# Patient Record
Sex: MALE | Race: WHITE | NOT HISPANIC OR LATINO | URBAN - METROPOLITAN AREA
[De-identification: names, ages, dates, MRNs, and addresses within clinical notes are randomized per-mention and may not be internally consistent; named-entity substitution may affect disease eponyms.]

---

## 2024-03-22 PROBLEM — Z00.00 ENCOUNTER FOR PREVENTIVE HEALTH EXAMINATION: Status: ACTIVE | Noted: 2024-03-22

## 2024-03-26 ENCOUNTER — OUTPATIENT (OUTPATIENT)
Dept: OUTPATIENT SERVICES | Facility: HOSPITAL | Age: 84
LOS: 1 days | End: 2024-03-26
Payer: COMMERCIAL

## 2024-03-26 DIAGNOSIS — Z12.9 ENCOUNTER FOR SCREENING FOR MALIGNANT NEOPLASM, SITE UNSPECIFIED: ICD-10-CM

## 2024-03-26 DIAGNOSIS — Z00.8 ENCOUNTER FOR OTHER GENERAL EXAMINATION: ICD-10-CM

## 2024-03-26 PROCEDURE — 74221 X-RAY XM ESOPHAGUS 2CNTRST: CPT

## 2024-03-26 PROCEDURE — 74221 X-RAY XM ESOPHAGUS 2CNTRST: CPT | Mod: 26

## 2024-03-27 DIAGNOSIS — Z12.9 ENCOUNTER FOR SCREENING FOR MALIGNANT NEOPLASM, SITE UNSPECIFIED: ICD-10-CM

## 2024-10-01 ENCOUNTER — INPATIENT (INPATIENT)
Facility: HOSPITAL | Age: 84
LOS: 2 days | Discharge: HOME | DRG: 871 | End: 2024-10-04
Attending: INTERNAL MEDICINE | Admitting: STUDENT IN AN ORGANIZED HEALTH CARE EDUCATION/TRAINING PROGRAM
Payer: MEDICARE

## 2024-10-01 VITALS
RESPIRATION RATE: 20 BRPM | WEIGHT: 240.08 LBS | OXYGEN SATURATION: 97 % | SYSTOLIC BLOOD PRESSURE: 140 MMHG | DIASTOLIC BLOOD PRESSURE: 70 MMHG | HEART RATE: 81 BPM | HEIGHT: 69 IN | TEMPERATURE: 102 F

## 2024-10-01 DIAGNOSIS — A41.9 SEPSIS, UNSPECIFIED ORGANISM: ICD-10-CM

## 2024-10-01 LAB
ALBUMIN SERPL ELPH-MCNC: 4.1 G/DL — SIGNIFICANT CHANGE UP (ref 3.5–5.2)
ALP SERPL-CCNC: 95 U/L — SIGNIFICANT CHANGE UP (ref 30–115)
ALT FLD-CCNC: 28 U/L — SIGNIFICANT CHANGE UP (ref 0–41)
ANION GAP SERPL CALC-SCNC: 13 MMOL/L — SIGNIFICANT CHANGE UP (ref 7–14)
APPEARANCE UR: CLEAR — SIGNIFICANT CHANGE UP
APTT BLD: 25.8 SEC — LOW (ref 27–39.2)
AST SERPL-CCNC: 27 U/L — SIGNIFICANT CHANGE UP (ref 0–41)
BACTERIA # UR AUTO: ABNORMAL /HPF
BASOPHILS # BLD AUTO: 0 K/UL — SIGNIFICANT CHANGE UP (ref 0–0.2)
BASOPHILS NFR BLD AUTO: 0 % — SIGNIFICANT CHANGE UP (ref 0–1)
BILIRUB SERPL-MCNC: 1.1 MG/DL — SIGNIFICANT CHANGE UP (ref 0.2–1.2)
BILIRUB UR-MCNC: ABNORMAL
BUN SERPL-MCNC: 23 MG/DL — HIGH (ref 10–20)
CALCIUM SERPL-MCNC: 9.3 MG/DL — SIGNIFICANT CHANGE UP (ref 8.4–10.5)
CHLORIDE SERPL-SCNC: 103 MMOL/L — SIGNIFICANT CHANGE UP (ref 98–110)
CO2 SERPL-SCNC: 23 MMOL/L — SIGNIFICANT CHANGE UP (ref 17–32)
COLOR SPEC: SIGNIFICANT CHANGE UP
CREAT SERPL-MCNC: 1.8 MG/DL — HIGH (ref 0.7–1.5)
DIFF PNL FLD: NEGATIVE — SIGNIFICANT CHANGE UP
EGFR: 37 ML/MIN/1.73M2 — LOW
EOSINOPHIL NFR BLD AUTO: 0 % — SIGNIFICANT CHANGE UP (ref 0–8)
EPI CELLS # UR: PRESENT
FLUAV AG NPH QL: SIGNIFICANT CHANGE UP
FLUBV AG NPH QL: SIGNIFICANT CHANGE UP
GLUCOSE SERPL-MCNC: 203 MG/DL — HIGH (ref 70–99)
GLUCOSE UR QL: NEGATIVE MG/DL — SIGNIFICANT CHANGE UP
HCT VFR BLD CALC: 45.4 % — SIGNIFICANT CHANGE UP (ref 42–52)
HGB BLD-MCNC: 15.6 G/DL — SIGNIFICANT CHANGE UP (ref 14–18)
INR BLD: 1.16 RATIO — SIGNIFICANT CHANGE UP (ref 0.65–1.3)
KETONES UR-MCNC: ABNORMAL MG/DL
LACTATE SERPL-SCNC: 3.7 MMOL/L — HIGH (ref 0.7–2)
LACTATE SERPL-SCNC: 3.9 MMOL/L — HIGH (ref 0.7–2)
LACTATE SERPL-SCNC: 4.6 MMOL/L — CRITICAL HIGH (ref 0.7–2)
LACTATE SERPL-SCNC: 6 MMOL/L — CRITICAL HIGH (ref 0.7–2)
LEUKOCYTE ESTERASE UR-ACNC: NEGATIVE — SIGNIFICANT CHANGE UP
LYMPHOCYTES # BLD AUTO: 0.62 K/UL — LOW (ref 1.2–3.4)
LYMPHOCYTES # BLD AUTO: 6 % — LOW (ref 20.5–51.1)
MCHC RBC-ENTMCNC: 31.5 PG — HIGH (ref 27–31)
MCHC RBC-ENTMCNC: 34.4 G/DL — SIGNIFICANT CHANGE UP (ref 32–37)
MCV RBC AUTO: 91.5 FL — SIGNIFICANT CHANGE UP (ref 80–94)
METAMYELOCYTES # FLD: 1 % — HIGH (ref 0–0)
MONOCYTES # BLD AUTO: 0.21 K/UL — SIGNIFICANT CHANGE UP (ref 0.1–0.6)
MONOCYTES NFR BLD AUTO: 2 % — SIGNIFICANT CHANGE UP (ref 1.7–9.3)
MUCOUS THREADS # UR AUTO: PRESENT
NEUTROPHILS # BLD AUTO: 9.43 K/UL — HIGH (ref 1.4–6.5)
NEUTROPHILS NFR BLD AUTO: 77 % — HIGH (ref 42.2–75.2)
NEUTS BAND # BLD: 14 % — HIGH (ref 0–6)
NITRITE UR-MCNC: NEGATIVE — SIGNIFICANT CHANGE UP
NRBC # BLD: 0 /100 WBCS — SIGNIFICANT CHANGE UP (ref 0–0)
NRBC # BLD: SIGNIFICANT CHANGE UP /100 WBCS (ref 0–0)
PH UR: 5.5 — SIGNIFICANT CHANGE UP (ref 5–8)
PLAT MORPH BLD: NORMAL — SIGNIFICANT CHANGE UP
PLATELET # BLD AUTO: 130 K/UL — SIGNIFICANT CHANGE UP (ref 130–400)
PLATELET COUNT - ESTIMATE: NORMAL — SIGNIFICANT CHANGE UP
PMV BLD: 10 FL — SIGNIFICANT CHANGE UP (ref 7.4–10.4)
POTASSIUM SERPL-MCNC: 4.1 MMOL/L — SIGNIFICANT CHANGE UP (ref 3.5–5)
POTASSIUM SERPL-SCNC: 4.1 MMOL/L — SIGNIFICANT CHANGE UP (ref 3.5–5)
PROT SERPL-MCNC: 6.8 G/DL — SIGNIFICANT CHANGE UP (ref 6–8)
PROT UR-MCNC: 100 MG/DL
PROTHROM AB SERPL-ACNC: 13.2 SEC — HIGH (ref 9.95–12.87)
RBC # BLD: 4.96 M/UL — SIGNIFICANT CHANGE UP (ref 4.7–6.1)
RBC # FLD: 12.5 % — SIGNIFICANT CHANGE UP (ref 11.5–14.5)
RBC BLD AUTO: NORMAL — SIGNIFICANT CHANGE UP
RBC CASTS # UR COMP ASSIST: 3 /HPF — SIGNIFICANT CHANGE UP (ref 0–4)
RSV RNA NPH QL NAA+NON-PROBE: SIGNIFICANT CHANGE UP
SARS-COV-2 RNA SPEC QL NAA+PROBE: SIGNIFICANT CHANGE UP
SODIUM SERPL-SCNC: 139 MMOL/L — SIGNIFICANT CHANGE UP (ref 135–146)
SP GR SPEC: 1.02 — SIGNIFICANT CHANGE UP (ref 1–1.03)
SQUAMOUS # UR AUTO: 2 /HPF — SIGNIFICANT CHANGE UP (ref 0–5)
UROBILINOGEN FLD QL: 1 MG/DL — SIGNIFICANT CHANGE UP (ref 0.2–1)
WBC # BLD: 10.36 K/UL — SIGNIFICANT CHANGE UP (ref 4.8–10.8)
WBC # FLD AUTO: 10.36 K/UL — SIGNIFICANT CHANGE UP (ref 4.8–10.8)
WBC UR QL: 4 /HPF — SIGNIFICANT CHANGE UP (ref 0–5)

## 2024-10-01 PROCEDURE — 84300 ASSAY OF URINE SODIUM: CPT

## 2024-10-01 PROCEDURE — 84540 ASSAY OF URINE/UREA-N: CPT

## 2024-10-01 PROCEDURE — 93970 EXTREMITY STUDY: CPT | Mod: 26

## 2024-10-01 PROCEDURE — 87640 STAPH A DNA AMP PROBE: CPT

## 2024-10-01 PROCEDURE — 83605 ASSAY OF LACTIC ACID: CPT

## 2024-10-01 PROCEDURE — 71045 X-RAY EXAM CHEST 1 VIEW: CPT | Mod: 26

## 2024-10-01 PROCEDURE — 99291 CRITICAL CARE FIRST HOUR: CPT

## 2024-10-01 PROCEDURE — 80053 COMPREHEN METABOLIC PANEL: CPT

## 2024-10-01 PROCEDURE — 87899 AGENT NOS ASSAY W/OPTIC: CPT

## 2024-10-01 PROCEDURE — 0225U NFCT DS DNA&RNA 21 SARSCOV2: CPT

## 2024-10-01 PROCEDURE — 87449 NOS EACH ORGANISM AG IA: CPT

## 2024-10-01 PROCEDURE — 87641 MR-STAPH DNA AMP PROBE: CPT

## 2024-10-01 PROCEDURE — 85025 COMPLETE CBC W/AUTO DIFF WBC: CPT

## 2024-10-01 PROCEDURE — 93306 TTE W/DOPPLER COMPLETE: CPT

## 2024-10-01 PROCEDURE — 83935 ASSAY OF URINE OSMOLALITY: CPT

## 2024-10-01 PROCEDURE — 99223 1ST HOSP IP/OBS HIGH 75: CPT

## 2024-10-01 PROCEDURE — 97162 PT EVAL MOD COMPLEX 30 MIN: CPT | Mod: GP

## 2024-10-01 PROCEDURE — 84133 ASSAY OF URINE POTASSIUM: CPT

## 2024-10-01 PROCEDURE — 82570 ASSAY OF URINE CREATININE: CPT

## 2024-10-01 PROCEDURE — 36415 COLL VENOUS BLD VENIPUNCTURE: CPT

## 2024-10-01 PROCEDURE — 84156 ASSAY OF PROTEIN URINE: CPT

## 2024-10-01 RX ORDER — ATENOLOL 25 MG/1
25 TABLET ORAL DAILY
Refills: 0 | Status: DISCONTINUED | OUTPATIENT
Start: 2024-10-01 | End: 2024-10-01

## 2024-10-01 RX ORDER — TAMSULOSIN HCL 0.4 MG
1 CAPSULE ORAL
Refills: 0 | DISCHARGE

## 2024-10-01 RX ORDER — CHLORHEXIDINE GLUCONATE ORAL RINSE 1.2 MG/ML
1 SOLUTION DENTAL
Refills: 0 | Status: DISCONTINUED | OUTPATIENT
Start: 2024-10-01 | End: 2024-10-04

## 2024-10-01 RX ORDER — SODIUM CHLORIDE IRRIG SOLUTION 0.9 %
1000 SOLUTION, IRRIGATION IRRIGATION ONCE
Refills: 0 | Status: DISCONTINUED | OUTPATIENT
Start: 2024-10-01 | End: 2024-10-04

## 2024-10-01 RX ORDER — GABAPENTIN 800 MG/1
2 TABLET, FILM COATED ORAL
Refills: 0 | DISCHARGE

## 2024-10-01 RX ORDER — 5-HYDROXYTRYPTOPHAN (5-HTP) 100 MG
3 TABLET,DISINTEGRATING ORAL AT BEDTIME
Refills: 0 | Status: DISCONTINUED | OUTPATIENT
Start: 2024-10-01 | End: 2024-10-04

## 2024-10-01 RX ORDER — VANCOMYCIN HCL-SODIUM CHLORIDE IV SOLN 1.5 GM/250ML-0.9% 1.5-0.9/25 GM/ML-%
1000 SOLUTION INTRAVENOUS EVERY 24 HOURS
Refills: 0 | Status: DISCONTINUED | OUTPATIENT
Start: 2024-10-01 | End: 2024-10-02

## 2024-10-01 RX ORDER — TAMSULOSIN HCL 0.4 MG
0.4 CAPSULE ORAL AT BEDTIME
Refills: 0 | Status: DISCONTINUED | OUTPATIENT
Start: 2024-10-01 | End: 2024-10-04

## 2024-10-01 RX ORDER — MAG HYDROX/ALUMINUM HYD/SIMETH 200-200-20
30 SUSPENSION, ORAL (FINAL DOSE FORM) ORAL EVERY 4 HOURS
Refills: 0 | Status: DISCONTINUED | OUTPATIENT
Start: 2024-10-01 | End: 2024-10-04

## 2024-10-01 RX ORDER — CEFEPIME 2 G/1
1000 INJECTION, POWDER, FOR SOLUTION INTRAVENOUS EVERY 12 HOURS
Refills: 0 | Status: DISCONTINUED | OUTPATIENT
Start: 2024-10-01 | End: 2024-10-02

## 2024-10-01 RX ORDER — CEFEPIME 2 G/1
2000 INJECTION, POWDER, FOR SOLUTION INTRAVENOUS ONCE
Refills: 0 | Status: COMPLETED | OUTPATIENT
Start: 2024-10-01 | End: 2024-10-01

## 2024-10-01 RX ORDER — SODIUM CHLORIDE 0.9 % (FLUSH) 0.9 %
1000 SYRINGE (ML) INJECTION
Refills: 0 | Status: COMPLETED | OUTPATIENT
Start: 2024-10-01 | End: 2024-10-02

## 2024-10-01 RX ORDER — FUROSEMIDE 10 MG/ML
1 INJECTION INTRAVENOUS
Refills: 0 | DISCHARGE

## 2024-10-01 RX ORDER — ONDANSETRON HCL/PF 4 MG/2 ML
4 VIAL (ML) INJECTION EVERY 8 HOURS
Refills: 0 | Status: DISCONTINUED | OUTPATIENT
Start: 2024-10-01 | End: 2024-10-04

## 2024-10-01 RX ORDER — GABAPENTIN 800 MG/1
300 TABLET, FILM COATED ORAL EVERY 8 HOURS
Refills: 0 | Status: DISCONTINUED | OUTPATIENT
Start: 2024-10-01 | End: 2024-10-04

## 2024-10-01 RX ORDER — ACETAMINOPHEN 325 MG
650 TABLET ORAL EVERY 6 HOURS
Refills: 0 | Status: DISCONTINUED | OUTPATIENT
Start: 2024-10-01 | End: 2024-10-04

## 2024-10-01 RX ORDER — SPIRONOLACTONE 100 MG/1
1 TABLET, FILM COATED ORAL
Refills: 0 | DISCHARGE

## 2024-10-01 RX ORDER — LOSARTAN POTASSIUM 100 MG/1
1 TABLET, FILM COATED ORAL
Refills: 0 | DISCHARGE

## 2024-10-01 RX ORDER — INFLUENZA VIRUS VACCINE 15; 15; 15; 15 UG/.5ML; UG/.5ML; UG/.5ML; UG/.5ML
0.5 SUSPENSION INTRAMUSCULAR ONCE
Refills: 0 | Status: DISCONTINUED | OUTPATIENT
Start: 2024-10-01 | End: 2024-10-04

## 2024-10-01 RX ORDER — SPIRONOLACTONE 100 MG/1
25 TABLET, FILM COATED ORAL DAILY
Refills: 0 | Status: DISCONTINUED | OUTPATIENT
Start: 2024-10-01 | End: 2024-10-01

## 2024-10-01 RX ORDER — VANCOMYCIN HCL-SODIUM CHLORIDE IV SOLN 1.5 GM/250ML-0.9% 1.5-0.9/25 GM/ML-%
1750 SOLUTION INTRAVENOUS ONCE
Refills: 0 | Status: COMPLETED | OUTPATIENT
Start: 2024-10-01 | End: 2024-10-01

## 2024-10-01 RX ORDER — SODIUM CHLORIDE IRRIG SOLUTION 0.9 %
3400 SOLUTION, IRRIGATION IRRIGATION ONCE
Refills: 0 | Status: COMPLETED | OUTPATIENT
Start: 2024-10-01 | End: 2024-10-01

## 2024-10-01 RX ORDER — ACETAMINOPHEN 325 MG
975 TABLET ORAL ONCE
Refills: 0 | Status: COMPLETED | OUTPATIENT
Start: 2024-10-01 | End: 2024-10-01

## 2024-10-01 RX ADMIN — GABAPENTIN 300 MILLIGRAM(S): 800 TABLET, FILM COATED ORAL at 21:16

## 2024-10-01 RX ADMIN — Medication 975 MILLIGRAM(S): at 12:04

## 2024-10-01 RX ADMIN — Medication 3400 MILLILITER(S): at 16:20

## 2024-10-01 RX ADMIN — CEFEPIME 100 MILLIGRAM(S): 2 INJECTION, POWDER, FOR SOLUTION INTRAVENOUS at 18:01

## 2024-10-01 RX ADMIN — CEFEPIME 100 MILLIGRAM(S): 2 INJECTION, POWDER, FOR SOLUTION INTRAVENOUS at 12:04

## 2024-10-01 RX ADMIN — Medication 3400 MILLILITER(S): at 12:06

## 2024-10-01 RX ADMIN — VANCOMYCIN HCL-SODIUM CHLORIDE IV SOLN 1.5 GM/250ML-0.9% 250 MILLIGRAM(S): 1.5-0.9/25 SOLUTION at 18:18

## 2024-10-01 RX ADMIN — Medication 0.4 MILLIGRAM(S): at 21:16

## 2024-10-01 RX ADMIN — Medication 100 MILLILITER(S): at 18:06

## 2024-10-01 RX ADMIN — Medication 650 MILLIGRAM(S): at 20:34

## 2024-10-01 RX ADMIN — VANCOMYCIN HCL-SODIUM CHLORIDE IV SOLN 1.5 GM/250ML-0.9% 250 MILLIGRAM(S): 1.5-0.9/25 SOLUTION at 12:09

## 2024-10-01 RX ADMIN — Medication 5000 UNIT(S): at 18:07

## 2024-10-01 NOTE — H&P ADULT - NSICDXPASTMEDICALHX_GEN_ALL_CORE_FT
PAST MEDICAL HISTORY:  BPH (benign prostatic hyperplasia)     CAD (coronary artery disease)     H/O neuropathy     HLD (hyperlipidemia)     HTN, age 0-18     Lymphedema

## 2024-10-01 NOTE — ED ADULT NURSE REASSESSMENT NOTE - NS ED NURSE REASSESS COMMENT FT1
pt continuously bending arm, occluding fluids. Placed second IVL in rt hand #20. Will obtain BPs after fluid boluses are completed.

## 2024-10-01 NOTE — H&P ADULT - NSHPPHYSICALEXAM_GEN_ALL_CORE
Vital Signs Last 24 Hrs  T(C): 36.9 (01 Oct 2024 16:47), Max: 38.7 (01 Oct 2024 11:36)  T(F): 98.5 (01 Oct 2024 16:47), Max: 101.7 (01 Oct 2024 11:36)  HR: 105 (01 Oct 2024 16:47) (81 - 117)  BP: 113/70 (01 Oct 2024 16:47) (101/58 - 140/70)  RR: 18 (01 Oct 2024 16:47) (16 - 20)  SpO2: 95% (01 Oct 2024 16:47) (95% - 97%)    Parameters below as of 01 Oct 2024 16:47  Patient On (Oxygen Delivery Method): room air      GENERAL:  84y/o Male NAD, resting comfortably.  EYES: EOMI, conjunctiva and sclera clear  CHEST/LUNG: Clear to auscultation bilaterally; No wheeze, rhonchi, or rales  HEART: Regular rate and rhythm; S1&S2  ABDOMEN: Soft, Nontender, Nondistended x 4 quadrants; Bowel sounds present  EXTREMITIES:   Peripheral Pulses Present, B/L LE swelling L>R, 2+ pitting edema b/l, mild ttp LLE w/ surrounding erythema   PSYCH: AAOx3, cooperative, appropriate  NEUROLOGY: WNL

## 2024-10-01 NOTE — H&P ADULT - HISTORY OF PRESENT ILLNESS
Patient is an 82yo male w/ pmhx of HTN, HLD, CAD s/p stent, chronic lymphedema, peripheral neuropathy, JA - not on CPAP, BPH, brought in by daughter for chills, weakness, and worsening lower extremity swelling w/ erythema. Patient reports he felt mild chills at home today. Daughter came to visit and noticed worsening swelling in the extremities prompting visit to the ED. Patient denies fever at home, n/v, chest pain, SOB, abdominal pain, changes in BMs, urinary sxs, sick contacts, recent travels.

## 2024-10-01 NOTE — H&P ADULT - NS ATTEND AMEND GEN_ALL_CORE FT
Patient is an 82yo male w/ pmhx of HTN, HLD, CAD s/p stent, chronic lymphedema, peripheral neuropathy, JA - not on CPAP, BPH, brought in by daughter for chills, weakness, and worsening lower extremity swelling w/ erythema being admitted for sepsis due to suspected cellulitis. Patient presented with family. Family reports that the patient was more confused, with high fevers and chills at home. His L lower extremity is more swollen than the R and more erythematous and hot to touch. Denies any other notable localizing symptoms, in particular no headaches, photophobia, sore throat, upper or lower resp symptoms, no abdominal pain or diarrhea, no nausea or vomiting, no new swollen joints, no urinary discomfort.    General: Appears comfortable, AOx4.   HEENT: NCAT  Neck: Supple  Heart: RRR  Lungs: CTAB  Extremities: Bilateral 1-2+ edema, L>R with significant erythema of the LLE.    #Sepsis  #Probable cellulitis  Patient presents with high fever, elevated lactate and mildly elevated white count with only localizing symptom a swollen LLE with erythema. Doppler negative for DVT. Blood cultures obtained. Patient tolerated a dose of vanc and cefepime nicely, will continue same antibiotics. Will consult ID. Will repeat a bolus of fluids and start maintenance @100cc/hr. Hold all blood pressure meds and beta blockers in status of sepsis. Repeat lactate at 19.00.  Rest per PA note    Code: Full  PPx: heparin

## 2024-10-01 NOTE — H&P ADULT - NSHPLABSRESULTS_GEN_ALL_CORE
15.6   10.36 )-----------( 130      ( 01 Oct 2024 11:40 )             45.4       10-01    139  |  103  |  23[H]  ----------------------------<  203[H]  4.1   |  23  |  1.8[H]    Ca    9.3      01 Oct 2024 11:40    TPro  6.8  /  Alb  4.1  /  TBili  1.1  /  DBili  x   /  AST  27  /  ALT  28  /  AlkPhos  95  10              Urinalysis Basic - ( 01 Oct 2024 15:50 )    Color: Dark Yellow / Appearance: Clear / S.025 / pH: x  Gluc: x / Ketone: Trace mg/dL  / Bili: Small / Urobili: 1.0 mg/dL   Blood: x / Protein: 100 mg/dL / Nitrite: Negative   Leuk Esterase: Negative / RBC: x / WBC x   Sq Epi: x / Non Sq Epi: x / Bacteria: x        PT/INR - ( 01 Oct 2024 11:40 )   PT: 13.20 sec;   INR: 1.16 ratio         PTT - ( 01 Oct 2024 11:40 )  PTT:25.8 sec    Lactate Trend  10-01 @ 12:49 Lactate:6.0   10-01 @ 11:40 Lactate:3.7       < from: [facility] Duplex Lower Ext Vein Scan, Bilat (10.01.24 @ 13:57) >  IMPRESSION:  No evidence of deep venous thrombosis in either lower extremity.  --- End of Report ---    < from: Xray Chest 1 View-PORTABLE IMMEDIATE (10.01.24 @ 12:13) >  Impression:  Bibasilar opacities.    --- End of Report ---

## 2024-10-01 NOTE — PATIENT PROFILE ADULT - FALL HARM RISK - HARM RISK INTERVENTIONS

## 2024-10-01 NOTE — ED PROVIDER NOTE - CLINICAL SUMMARY MEDICAL DECISION MAKING FREE TEXT BOX
83-year-old male past medical history of CAD with stents, lymphedema, sleep apnea presents to the emergency department for weakness, chills, redness and swelling to his leg on the left.  Additional history obtained from patient's daughter reports patient's had increasing weakness and confusion.  Patient developed a fever in the emergency department upon arrival but just had chills at home.  Patient had mildly decreased p.o. intake.  No falls or direct trauma.  No history of DVT or PE.    On exam, vital signs reviewed.  Patient febrile and concern for sepsis.  Patient has redness, warmth, swelling to left lower extremity concerning for cellulitis.  Patient has no abdominal pain or upper respiratory symptoms.  IV placed and labs sent, patient started on fluid resuscitation and immediate IV antibiotics.  Initial lactate sent and is elevated and was repeated and is higher.  DVT study performed and negative for DVT.  Patient has an elevated white blood cell count.  Patient remained hemodynamically stable and will admit for IV antibiotics and continued treatment and resuscitation for severe sepsis.    ED work up reviewed and results and plan of care discussed with patient. Patient requires admission for further work up, monitoring, and management. Need for admission discussed with patient.

## 2024-10-01 NOTE — H&P ADULT - ASSESSMENT
Patient is an 84yo male w/ pmhx of HTN, HLD, CAD s/p stent, chronic lymphedema, peripheral neuropathy, JA - not on CPAP, BPH, brought in by daughter for chills, weakness, and worsening lower extremity swelling w/ erythema being admitted for sepsis 2/2 to cellulitis     #Sepsis   #LLE Cellulitis   - Temp 101, , WBC 10.36 w/ bands, Lactate 3.7 > 6.0  - B/l LE Duplex - No evidence of deep venous thrombosis in either lower extremity.  - IV abx - Cefepime & Vancomycin   - s/p 3400ml IVF Bolus  - IVF - NS @100ml/hr x 12hrs, FU repeat Lactate   - FU BCx  - Tylenol prn for fever  - Monitor WBC   - ID Consult    #ARCHIE  - Cr 1.8, Baseline Cr unknown  - Last Cr in [facility] in 2016 - 1.27  - will hold losartan and furosemide in setting of ARCHIE, continue in am  - s/p IV Bolus  - Trend RF  - Urine lytes, AM Labs  - Avoid Nephrotoxic agents    #Chronic Lymphedema   #HTN  - hold losartan, furosemide, spironolactone in setting of ARCHIE  - likely will benefit from diuresis once improvement in lactate and renal function   - I&Os  - Daily weights   - Monitor BP     #HLD  - c/w Lipitor 20mg QD    #Peripheral Neuropathy   - Reports taking 1200mg of Gabapentin in am, will renally adjust it to 300 Q8 in setting of ARCHIE     #BPH  - c/w Flomax     Diet: DASH   Activity: AAT   VTE PPX: SubCut Heparin   Dispo: acute from home, septic    Plan Discussed and approved by attending on call.   Patient is an 82yo male w/ pmhx of HTN, HLD, CAD s/p stent, chronic lymphedema, peripheral neuropathy, JA - not on CPAP, BPH, brought in by daughter for chills, weakness, and worsening lower extremity swelling w/ erythema being admitted for sepsis 2/2 to cellulitis     #Sepsis   #LLE Cellulitis   - Temp 101, , WBC 10.36 w/ bands, Lactate 3.7 > 6.0  - B/l LE Duplex - No evidence of deep venous thrombosis in either lower extremity.  - IV abx - Cefepime & Vancomycin   - s/p 3400ml IVF Bolus  - IVF - NS @100ml/hr x 12hrs, FU repeat Lactate   - FU BCx  - Tylenol prn for fever  - Monitor WBC   - ID Consult    #ARCHIE  - Cr 1.8, Baseline Cr unknown  - Last Cr in [facility] in 2016 - 1.27  - NS @100ml/hr x 12 hrs   - will hold losartan and furosemide in setting of ARCHIE, continue in am if improvement   - Trend RF  - Urine lytes, AM Labs  - Avoid Nephrotoxic agents    #Chronic Lymphedema   #HTN  - hold losartan, furosemide, spironolactone in setting of ARCHIE  - c/w spironolactone 25mg QD   - likely will benefit from diuresis once improvement in lactate and renal function   - I&Os  - Daily weights   - Monitor BP   - Keep LE elevated     #HLD  - c/w Lipitor 20mg QD    #Peripheral Neuropathy   - Reports taking 1200mg of Gabapentin in am, will renally adjust it to 300 Q8 in setting of ARCHIE     #BPH  - c/w Flomax     Diet: DASH   Activity: AAT   VTE PPX: SubCut Heparin   Dispo: acute from home, septic    Plan Discussed and approved by attending on call.

## 2024-10-01 NOTE — ED PROVIDER NOTE - OBJECTIVE STATEMENT
82 y/o male with hx of CAD with stent , JA non compliant with CPAP, lymphedema, presents to the ED for evaluation of chills, weakness and confusion as per daughter. no vomiting or diarrhea. patient denies any sick contacts or travel. no sore throat, ear pain . no cough, sob, hemoptysis. patient with left lower extremity increased redness and warmth.

## 2024-10-01 NOTE — ED PROVIDER NOTE - CARE PLAN
Principal Discharge DX:	Sepsis  Secondary Diagnosis:	Cellulitis of leg, left   1 Principal Discharge DX:	Severe sepsis  Secondary Diagnosis:	Cellulitis of leg, left

## 2024-10-01 NOTE — ED PROVIDER NOTE - PHYSICAL EXAMINATION
Vital Signs: I have reviewed the initial vital signs.  Constitutional: well-nourished, no acute distress, normocephalic  Eyes: PERRLA, EOMI, clear conjunctiva  ENT: dry mucous membranes  Cardiovascular: regular rate, regular rhythm, no murmur appreciated  Respiratory: unlabored respiratory effort, clear to auscultation bilaterally  Gastrointestinal: soft, non-tender, non-distended  abdomen, no pulsatile mass  Musculoskeletal: supple neck, b/l lymphedema, LLE erythema, warmth, non weeping, no lymphangitis , no crepitation , distal pulses in tact   Integumentary: erythema to left lower extremity   Neurologic: awake, alert, cranial nerves II-XII grossly intact, extremities’ motor and sensory functions grossly intact, no focal deficits, GCS 15

## 2024-10-01 NOTE — ED PROVIDER NOTE - CRITICAL CARE ATTENDING CONTRIBUTION TO CARE
I personally evaluated patient. I agree with the findings and plan with all documentation on chart except as documented  in my note.    83-year-old male past medical history of CAD with stents, lymphedema, sleep apnea presents to the emergency department for weakness, chills, redness and swelling to his leg on the left.  Additional history obtained from patient's daughter reports patient's had increasing weakness and confusion.  Patient developed a fever in the emergency department upon arrival but just had chills at home.  Patient had mildly decreased p.o. intake.  No falls or direct trauma.  No history of DVT or PE.    On exam, vital signs reviewed.  Patient febrile and concern for sepsis.  Patient has redness, warmth, swelling to left lower extremity concerning for cellulitis.  Patient has no abdominal pain or upper respiratory symptoms.  IV placed and labs sent, patient started on fluid resuscitation and immediate IV antibiotics.  Initial lactate sent and is elevated and was repeated and is higher.  DVT study performed and negative for DVT.  Patient has an elevated white blood cell count.  Patient remained hemodynamically stable and will admit for IV antibiotics and continued treatment and resuscitation for severe sepsis.    ED work up reviewed and results and plan of care discussed with patient. Patient requires admission for further work up, monitoring, and management. Need for admission discussed with patient.

## 2024-10-01 NOTE — ED PROVIDER NOTE - PROGRESS NOTE DETAILS
Sepsis suspected at this time. Will send appropriate labs and cultures. 30 cc/kg fluid bolus given based on ideal body weight. Will give broad spectrum antibiotics after blood cultures are drawn. Will follow up initial lactate and repeat after fluids if lactate > 2. Repeat sepsis perfusion exam performed. Patient has warm skin with strong pulses and good capillary refill. Lungs are clear post IV fluid resuscitation. Repeat sepsis perfusion exam performed. Patient has warm skin with strong pulses and good capillary refill. Lungs are clear post IV fluid resuscitation. Initial lactate elevated and will be repeated.

## 2024-10-02 LAB
ALBUMIN SERPL ELPH-MCNC: 3.4 G/DL — LOW (ref 3.5–5.2)
ALP SERPL-CCNC: 87 U/L — SIGNIFICANT CHANGE UP (ref 30–115)
ALT FLD-CCNC: 20 U/L — SIGNIFICANT CHANGE UP (ref 0–41)
ANION GAP SERPL CALC-SCNC: 11 MMOL/L — SIGNIFICANT CHANGE UP (ref 7–14)
AST SERPL-CCNC: 27 U/L — SIGNIFICANT CHANGE UP (ref 0–41)
BASOPHILS # BLD AUTO: 0.03 K/UL — SIGNIFICANT CHANGE UP (ref 0–0.2)
BASOPHILS NFR BLD AUTO: 0.2 % — SIGNIFICANT CHANGE UP (ref 0–1)
BILIRUB SERPL-MCNC: 1.1 MG/DL — SIGNIFICANT CHANGE UP (ref 0.2–1.2)
BUN SERPL-MCNC: 23 MG/DL — HIGH (ref 10–20)
CALCIUM SERPL-MCNC: 8.5 MG/DL — SIGNIFICANT CHANGE UP (ref 8.4–10.5)
CHLORIDE SERPL-SCNC: 101 MMOL/L — SIGNIFICANT CHANGE UP (ref 98–110)
CO2 SERPL-SCNC: 26 MMOL/L — SIGNIFICANT CHANGE UP (ref 17–32)
CREAT ?TM UR-MCNC: 105 MG/DL — SIGNIFICANT CHANGE UP
CREAT SERPL-MCNC: 1.6 MG/DL — HIGH (ref 0.7–1.5)
EGFR: 42 ML/MIN/1.73M2 — LOW
EOSINOPHIL # BLD AUTO: 0 K/UL — SIGNIFICANT CHANGE UP (ref 0–0.7)
EOSINOPHIL NFR BLD AUTO: 0 % — SIGNIFICANT CHANGE UP (ref 0–8)
GLUCOSE SERPL-MCNC: 125 MG/DL — HIGH (ref 70–99)
HCT VFR BLD CALC: 40.8 % — LOW (ref 42–52)
HGB BLD-MCNC: 13.8 G/DL — LOW (ref 14–18)
IMM GRANULOCYTES NFR BLD AUTO: 0.8 % — HIGH (ref 0.1–0.3)
LACTATE SERPL-SCNC: 2.4 MMOL/L — HIGH (ref 0.7–2)
LYMPHOCYTES # BLD AUTO: 0.96 K/UL — LOW (ref 1.2–3.4)
LYMPHOCYTES # BLD AUTO: 7.1 % — LOW (ref 20.5–51.1)
MCHC RBC-ENTMCNC: 31.6 PG — HIGH (ref 27–31)
MCHC RBC-ENTMCNC: 33.8 G/DL — SIGNIFICANT CHANGE UP (ref 32–37)
MCV RBC AUTO: 93.4 FL — SIGNIFICANT CHANGE UP (ref 80–94)
MONOCYTES # BLD AUTO: 0.52 K/UL — SIGNIFICANT CHANGE UP (ref 0.1–0.6)
MONOCYTES NFR BLD AUTO: 3.8 % — SIGNIFICANT CHANGE UP (ref 1.7–9.3)
MRSA PCR RESULT.: NEGATIVE — SIGNIFICANT CHANGE UP
NEUTROPHILS # BLD AUTO: 11.96 K/UL — HIGH (ref 1.4–6.5)
NEUTROPHILS NFR BLD AUTO: 88.1 % — HIGH (ref 42.2–75.2)
NRBC # BLD: 0 /100 WBCS — SIGNIFICANT CHANGE UP (ref 0–0)
PLATELET # BLD AUTO: 125 K/UL — LOW (ref 130–400)
PMV BLD: 10.7 FL — HIGH (ref 7.4–10.4)
POTASSIUM SERPL-MCNC: 4.6 MMOL/L — SIGNIFICANT CHANGE UP (ref 3.5–5)
POTASSIUM SERPL-SCNC: 4.6 MMOL/L — SIGNIFICANT CHANGE UP (ref 3.5–5)
POTASSIUM UR-SCNC: 23 MMOL/L — SIGNIFICANT CHANGE UP
PROT ?TM UR-MCNC: 31 MG/DLG/24H — SIGNIFICANT CHANGE UP
PROT SERPL-MCNC: 5.9 G/DL — LOW (ref 6–8)
PROT/CREAT UR-RTO: 0.3 RATIO — HIGH (ref 0–0.2)
RBC # BLD: 4.37 M/UL — LOW (ref 4.7–6.1)
RBC # FLD: 13.2 % — SIGNIFICANT CHANGE UP (ref 11.5–14.5)
SODIUM SERPL-SCNC: 138 MMOL/L — SIGNIFICANT CHANGE UP (ref 135–146)
SODIUM UR-SCNC: 76 MMOL/L — SIGNIFICANT CHANGE UP
UUN UR-MCNC: 667 MG/DL — SIGNIFICANT CHANGE UP
WBC # BLD: 13.58 K/UL — HIGH (ref 4.8–10.8)
WBC # FLD AUTO: 13.58 K/UL — HIGH (ref 4.8–10.8)

## 2024-10-02 PROCEDURE — 99232 SBSQ HOSP IP/OBS MODERATE 35: CPT

## 2024-10-02 RX ORDER — CEFTRIAXONE SODIUM 1 G
1000 VIAL (EA) INJECTION EVERY 24 HOURS
Refills: 0 | Status: DISCONTINUED | OUTPATIENT
Start: 2024-10-02 | End: 2024-10-03

## 2024-10-02 RX ORDER — AZITHROMYCIN 250 MG/1
500 TABLET, FILM COATED ORAL EVERY 24 HOURS
Refills: 0 | Status: DISCONTINUED | OUTPATIENT
Start: 2024-10-02 | End: 2024-10-03

## 2024-10-02 RX ORDER — LOSARTAN POTASSIUM 100 MG/1
25 TABLET, FILM COATED ORAL DAILY
Refills: 0 | Status: DISCONTINUED | OUTPATIENT
Start: 2024-10-02 | End: 2024-10-04

## 2024-10-02 RX ORDER — FUROSEMIDE 10 MG/ML
40 INJECTION INTRAVENOUS DAILY
Refills: 0 | Status: DISCONTINUED | OUTPATIENT
Start: 2024-10-02 | End: 2024-10-04

## 2024-10-02 RX ADMIN — Medication 100 MILLIGRAM(S): at 17:56

## 2024-10-02 RX ADMIN — Medication 5000 UNIT(S): at 17:57

## 2024-10-02 RX ADMIN — CEFEPIME 100 MILLIGRAM(S): 2 INJECTION, POWDER, FOR SOLUTION INTRAVENOUS at 05:26

## 2024-10-02 RX ADMIN — GABAPENTIN 300 MILLIGRAM(S): 800 TABLET, FILM COATED ORAL at 21:19

## 2024-10-02 RX ADMIN — GABAPENTIN 300 MILLIGRAM(S): 800 TABLET, FILM COATED ORAL at 13:30

## 2024-10-02 RX ADMIN — GABAPENTIN 300 MILLIGRAM(S): 800 TABLET, FILM COATED ORAL at 05:25

## 2024-10-02 RX ADMIN — LOSARTAN POTASSIUM 25 MILLIGRAM(S): 100 TABLET, FILM COATED ORAL at 17:56

## 2024-10-02 RX ADMIN — FUROSEMIDE 40 MILLIGRAM(S): 10 INJECTION INTRAVENOUS at 17:56

## 2024-10-02 RX ADMIN — AZITHROMYCIN 255 MILLIGRAM(S): 250 TABLET, FILM COATED ORAL at 17:56

## 2024-10-02 RX ADMIN — Medication 5000 UNIT(S): at 05:26

## 2024-10-02 RX ADMIN — Medication 0.4 MILLIGRAM(S): at 21:20

## 2024-10-02 RX ADMIN — CHLORHEXIDINE GLUCONATE ORAL RINSE 1 APPLICATION(S): 1.2 SOLUTION DENTAL at 05:26

## 2024-10-02 NOTE — CONSULT NOTE ADULT - SUBJECTIVE AND OBJECTIVE BOX
INFECTIOUS DISEASE CONSULT NOTE    Patient is a 83y old  Male who presents with a chief complaint of   HPI:  Patient is an 82yo male w/ pmhx of HTN, HLD, CAD s/p stent, chronic lymphedema, peripheral neuropathy, JA - not on CPAP, BPH, brought in by daughter for chills, weakness, and worsening lower extremity swelling w/ erythema. Patient reports he felt mild chills at home today. Daughter came to visit and noticed worsening swelling in the extremities prompting visit to the ED. Patient denies fever at home, n/v, chest pain, SOB, abdominal pain, changes in BMs, urinary sxs, sick contacts, recent travels.    (01 Oct 2024 16:47)         Prior hospital charts reviewed [Yes]  Primary team notes reviewed [Yes]  Other consultant notes reviewed [Yes]    REVIEW OF SYSTEMS:      PAST MEDICAL & SURGICAL HISTORY:  HTN, age 0-18      HLD (hyperlipidemia)      CAD (coronary artery disease)      BPH (benign prostatic hyperplasia)      Lymphedema      H/O neuropathy          SOCIAL HISTORY:  - Born in [place], migrated [place] in 19XX  - Currently working as / Retired  - Lives with _____; no pets  - No recent travel  - Denies tobacco use  - Denies alcohol use  - Denies illicit drug use  - Currently sexually active, has one male/female sexual partner    FAMILY HISTORY:      Allergies:  penicillins (Other)      ANTIMICROBIALS:  cefepime   IVPB 1000 every 12 hours  vancomycin  IVPB 1000 every 24 hours      ANTIMICROBIALS (past 90 days):  MEDICATIONS  (STANDING):  cefepime   IVPB   100 mL/Hr IV Intermittent (10-02-24 @ 05:26)   100 mL/Hr IV Intermittent (10-01-24 @ 18:01)    cefepime   IVPB   100 mL/Hr IV Intermittent (10-01-24 @ 12:04)    vancomycin  IVPB   250 mL/Hr IV Intermittent (10-01-24 @ 18:18)    vancomycin  IVPB   250 mL/Hr IV Intermittent (10-01-24 @ 12:09)        OTHER MEDS:   MEDICATIONS  (STANDING):  acetaminophen     Tablet .. 650 every 6 hours PRN  aluminum hydroxide/magnesium hydroxide/simethicone Suspension 30 every 4 hours PRN  gabapentin 300 every 8 hours  heparin   Injectable 5000 every 12 hours  influenza  Vaccine (HIGH DOSE) 0.5 once  melatonin 3 at bedtime PRN  ondansetron Injectable 4 every 8 hours PRN  tamsulosin 0.4 at bedtime      VITALS:  Vital Signs Last 24 Hrs  T(F): 99.4 (10-02-24 @ 04:47), Max: 101.7 (10-01-24 @ 11:36)    Vital Signs Last 24 Hrs  HR: 98 (10-02-24 @ 04:47) (81 - 122)  BP: 143/69 (10-02-24 @ 04:47) (101/58 - 143/69)  RR: 18 (10-02-24 @ 04:47)  SpO2: 95% (10-02-24 @ 04:47) (95% - 97%)  Wt(kg): --    EXAM:    Labs:                        13.8   13.58 )-----------( 125      ( 02 Oct 2024 05:53 )             40.8     10-02    138  |  101  |  23[H]  ----------------------------<  125[H]  4.6   |  26  |  1.6[H]    Ca    8.5      02 Oct 2024 05:53    TPro  5.9[L]  /  Alb  3.4[L]  /  TBili  1.1  /  DBili  x   /  AST  27  /  ALT  20  /  AlkPhos  87  10-02      WBC Trend:  WBC Count: 13.58 (10-02-24 @ 05:53)  WBC Count: 10.36 (10-01-24 @ 11:40)      Auto Neutrophil #: 11.96 K/uL (10-02-24 @ 05:53)  Auto Neutrophil #: 9.43 K/uL (10-01-24 @ 11:40)  Band Neutrophils %: 14.0 % (10-01-24 @ 11:40)      Creatine Trend:  Creatinine: 1.6 (10-02)  Creatinine: 1.8 (10-01)      Liver Biochemical Testing Trend:  Alanine Aminotransferase (ALT/SGPT): 20 (10-02)  Alanine Aminotransferase (ALT/SGPT): 28 (10-01)  Aspartate Aminotransferase (AST/SGOT): 27 (10-02-24 @ 05:53)  Aspartate Aminotransferase (AST/SGOT): 27 (10-01-24 @ 11:40)  Bilirubin Total: 1.1 (10-02)  Bilirubin Total: 1.1 (10-01)      Trend LDH      Auto Eosinophil %: 0.0 % (10-02-24 @ 05:53)  Auto Eosinophil %: 0.0 % (10-01-24 @ 11:40)      Urinalysis Basic - ( 02 Oct 2024 05:53 )    Color: x / Appearance: x / SG: x / pH: x  Gluc: 125 mg/dL / Ketone: x  / Bili: x / Urobili: x   Blood: x / Protein: x / Nitrite: x   Leuk Esterase: x / RBC: x / WBC x   Sq Epi: x / Non Sq Epi: x / Bacteria: x          MICROBIOLOGY:    Urinalysis with Rflx Culture (collected 01 Oct 2024 15:50)    Lactate, Blood: 2.4 (10-02 @ 05:53)  Lactate, Blood: 3.9 (10-01 @ 19:14)  Lactate, Blood: 4.6 (10-01 @ 18:00)  Lactate, Blood: 6.0 (10-01 @ 12:49)          RADIOLOGY:  imaging below personally reviewed    < from: VA Duplex Lower Ext Vein Scan, Bilat (10.01.24 @ 13:57) >  IMPRESSION:  No evidence of deep venous thrombosis in either lower extremity.    < end of copied text >    < from: Xray Chest 1 View-PORTABLE IMMEDIATE (10.01.24 @ 12:13) >  Impression:    Bibasilar opacities.    < end of copied text >   INFECTIOUS DISEASE CONSULT NOTE    Patient is a 83y old  Male who presents with a chief complaint of LLE swelling  HPI:  Patient is an 84yo male w/ pmhx of HTN, HLD, CAD s/p stent, chronic lymphedema, peripheral neuropathy, JA - not on CPAP, BPH, brought in by daughter for chills, weakness, and worsening lower extremity swelling w/ erythema. Patient reports he felt mild chills at home today. Daughter came to visit and noticed worsening swelling in the extremities prompting visit to the ED. Patient denies fever at home, n/v, chest pain, SOB, abdominal pain, changes in BMs, urinary sxs, sick contacts, recent travels.    (01 Oct 2024 16:47)         Prior hospital charts reviewed [Yes]  Primary team notes reviewed [Yes]  Other consultant notes reviewed [Yes]    REVIEW OF SYSTEMS:  CONSTITUTIONAL: No fever or chills  HEAD: No lesion on scalp  EYES: No visual disturbance  ENT: No sore throat  RESPIRATORY: + non productive cough, no shortness of breath  CARDIOVASCULAR: No chest pain or palpitations  GASTROINTESTINAL: No abdominal or epigastric pain; + bilateral LE swelling  GENITOURINARY: No dysuria  NEUROLOGICAL: No headache/dizziness  MUSCULOSKELETAL: No joint pain, erythema, or swelling; no back pain  SKIN: No itching, rashes  All other ROS negative except noted above      PAST MEDICAL & SURGICAL HISTORY:  HTN, age 0-18      HLD (hyperlipidemia)      CAD (coronary artery disease)      BPH (benign prostatic hyperplasia)      Lymphedema      H/O neuropathy      SOCIAL HISTORY:  - No recent travel  - Denies tobacco use  - Denies alcohol use  - Denies illicit drug use    FAMILY HISTORY:  Hx of CAD    Allergies:  penicillins (Other)      ANTIMICROBIALS:  cefepime   IVPB 1000 every 12 hours  vancomycin  IVPB 1000 every 24 hours      ANTIMICROBIALS (past 90 days):  MEDICATIONS  (STANDING):  cefepime   IVPB   100 mL/Hr IV Intermittent (10-02-24 @ 05:26)   100 mL/Hr IV Intermittent (10-01-24 @ 18:01)    cefepime   IVPB   100 mL/Hr IV Intermittent (10-01-24 @ 12:04)    vancomycin  IVPB   250 mL/Hr IV Intermittent (10-01-24 @ 18:18)    vancomycin  IVPB   250 mL/Hr IV Intermittent (10-01-24 @ 12:09)        OTHER MEDS:   MEDICATIONS  (STANDING):  acetaminophen     Tablet .. 650 every 6 hours PRN  aluminum hydroxide/magnesium hydroxide/simethicone Suspension 30 every 4 hours PRN  gabapentin 300 every 8 hours  heparin   Injectable 5000 every 12 hours  influenza  Vaccine (HIGH DOSE) 0.5 once  melatonin 3 at bedtime PRN  ondansetron Injectable 4 every 8 hours PRN  tamsulosin 0.4 at bedtime      VITALS:  Vital Signs Last 24 Hrs  T(F): 99.4 (10-02-24 @ 04:47), Max: 101.7 (10-01-24 @ 11:36)    Vital Signs Last 24 Hrs  HR: 98 (10-02-24 @ 04:47) (81 - 122)  BP: 143/69 (10-02-24 @ 04:47) (101/58 - 143/69)  RR: 18 (10-02-24 @ 04:47)  SpO2: 95% (10-02-24 @ 04:47) (95% - 97%)  Wt(kg): --    EXAM:  GENERAL: NAD, lying in bed  HEAD: No head lesions  EYES: Conjunctiva pink and cornea white  EAR, NOSE, MOUTH, THROAT: Normal external ears and nose, no discharges; moist mucous membranes  NECK: Supple, nontender to palpation; no JVD  RESPIRATORY: Bibasilar crackles  CARDIOVASCULAR: S1 S2  GASTROINTESTINAL: Soft, nontender, nondistended; normoactive bowel sounds  GENITOURINARY: No bowling catheter, no CVA tenderness  EXTREMITIES: No clubbing, cyanosis, + bilateral LE edema  NERVOUS SYSTEM: Alert and oriented to person, time, place and situation, speech clear. No focal deficits   MUSCULOSKELETAL: No joint erythema, swelling or pain  SKIN: Erythema and swelling on bilateral LEs, L > right. No significant tenderness. No active wound. no superficial thrombophlebitis  PSYCH: Normal affect      Labs:                        13.8   13.58 )-----------( 125      ( 02 Oct 2024 05:53 )             40.8     10-02    138  |  101  |  23[H]  ----------------------------<  125[H]  4.6   |  26  |  1.6[H]    Ca    8.5      02 Oct 2024 05:53    TPro  5.9[L]  /  Alb  3.4[L]  /  TBili  1.1  /  DBili  x   /  AST  27  /  ALT  20  /  AlkPhos  87  10-02      WBC Trend:  WBC Count: 13.58 (10-02-24 @ 05:53)  WBC Count: 10.36 (10-01-24 @ 11:40)      Auto Neutrophil #: 11.96 K/uL (10-02-24 @ 05:53)  Auto Neutrophil #: 9.43 K/uL (10-01-24 @ 11:40)  Band Neutrophils %: 14.0 % (10-01-24 @ 11:40)      Creatine Trend:  Creatinine: 1.6 (10-02)  Creatinine: 1.8 (10-01)      Liver Biochemical Testing Trend:  Alanine Aminotransferase (ALT/SGPT): 20 (10-02)  Alanine Aminotransferase (ALT/SGPT): 28 (10-01)  Aspartate Aminotransferase (AST/SGOT): 27 (10-02-24 @ 05:53)  Aspartate Aminotransferase (AST/SGOT): 27 (10-01-24 @ 11:40)  Bilirubin Total: 1.1 (10-02)  Bilirubin Total: 1.1 (10-01)      Trend LDH      Auto Eosinophil %: 0.0 % (10-02-24 @ 05:53)  Auto Eosinophil %: 0.0 % (10-01-24 @ 11:40)      Urinalysis Basic - ( 02 Oct 2024 05:53 )    Color: x / Appearance: x / SG: x / pH: x  Gluc: 125 mg/dL / Ketone: x  / Bili: x / Urobili: x   Blood: x / Protein: x / Nitrite: x   Leuk Esterase: x / RBC: x / WBC x   Sq Epi: x / Non Sq Epi: x / Bacteria: x          MICROBIOLOGY:    Urinalysis with Rflx Culture (collected 01 Oct 2024 15:50)    Lactate, Blood: 2.4 (10-02 @ 05:53)  Lactate, Blood: 3.9 (10-01 @ 19:14)  Lactate, Blood: 4.6 (10-01 @ 18:00)  Lactate, Blood: 6.0 (10-01 @ 12:49)          RADIOLOGY:  imaging below personally reviewed    < from: VA Duplex Lower Ext Vein Scan, Bilat (10.01.24 @ 13:57) >  IMPRESSION:  No evidence of deep venous thrombosis in either lower extremity.    < end of copied text >    < from: Xray Chest 1 View-PORTABLE IMMEDIATE (10.01.24 @ 12:13) >  Impression:    Bibasilar opacities.    < end of copied text >

## 2024-10-02 NOTE — CONSULT NOTE ADULT - ASSESSMENT
ID is consulted for cellulitis  Febrile to 101.7, WBC 10.36 > 13.58  On room air  COVID/flu/RSV negative  UA no pyuria  BCx pending    Duplex LE no DVT  CXR with bibasilar opacities    Antibiotics:  Vancomycin 10/1 ->  Cefepime 10/1 ->      IMPRESSION:      RECOMMENDATIONS:      * THIS IS AN INCOMPLETE NOTE. FINAL RECOMMENDATION IS PENDING *   84yo male w/ pmhx of HTN, HLD, CAD s/p stent, chronic lymphedema, peripheral neuropathy, JA - not on CPAP, BPH, brought in by daughter for chills, weakness, and worsening lower extremity swelling w/ erythema.     ID is consulted for cellulitis  Febrile to 101.7, WBC 10.36 > 13.58  On room air  COVID/flu/RSV negative  UA no pyuria  BCx pending    Duplex LE no DVT  CXR with bibasilar opacities    Antibiotics:  Vancomycin 10/1 ->  Cefepime 10/1 ->      IMPRESSION:  Bibasilar pneumonia  Sepsis  Lactic acidosis  Venous stasis dermatitis  Chronic bilateral LE lymphedema  CAD  OPA    RECOMMENDATIONS:  - No significant cellulitis on LLE; patient's ongoing coughing and CXR finding could indicates pneumonia  - D/C vancomycin and cefepime  - Start IV ceftriaxone 1g q24hrs  - Follow up BCx  - Obtain MRSA nare PCR, urine Strep Ag  - Leg elevation and topical steroid for LLE  - Offloading and frequent position changes, aspiration precaution  - Trend WBC, fever curve, transaminases, creatinine daily      Opal Lopes D.O.  Attending Physician  Division of Infectious Diseases  [facility]  Please contact me via [employer] Teams

## 2024-10-03 ENCOUNTER — RESULT REVIEW (OUTPATIENT)
Age: 84
End: 2024-10-03

## 2024-10-03 LAB
ALBUMIN SERPL ELPH-MCNC: 3.4 G/DL — LOW (ref 3.5–5.2)
ALP SERPL-CCNC: 73 U/L — SIGNIFICANT CHANGE UP (ref 30–115)
ALT FLD-CCNC: 17 U/L — SIGNIFICANT CHANGE UP (ref 0–41)
ANION GAP SERPL CALC-SCNC: 14 MMOL/L — SIGNIFICANT CHANGE UP (ref 7–14)
AST SERPL-CCNC: 24 U/L — SIGNIFICANT CHANGE UP (ref 0–41)
BASOPHILS # BLD AUTO: 0.03 K/UL — SIGNIFICANT CHANGE UP (ref 0–0.2)
BASOPHILS NFR BLD AUTO: 0.3 % — SIGNIFICANT CHANGE UP (ref 0–1)
BILIRUB SERPL-MCNC: 0.8 MG/DL — SIGNIFICANT CHANGE UP (ref 0.2–1.2)
BUN SERPL-MCNC: 19 MG/DL — SIGNIFICANT CHANGE UP (ref 10–20)
CALCIUM SERPL-MCNC: 8.4 MG/DL — SIGNIFICANT CHANGE UP (ref 8.4–10.5)
CHLORIDE SERPL-SCNC: 99 MMOL/L — SIGNIFICANT CHANGE UP (ref 98–110)
CO2 SERPL-SCNC: 26 MMOL/L — SIGNIFICANT CHANGE UP (ref 17–32)
CREAT SERPL-MCNC: 1.4 MG/DL — SIGNIFICANT CHANGE UP (ref 0.7–1.5)
EGFR: 50 ML/MIN/1.73M2 — LOW
EOSINOPHIL # BLD AUTO: 0.15 K/UL — SIGNIFICANT CHANGE UP (ref 0–0.7)
EOSINOPHIL NFR BLD AUTO: 1.6 % — SIGNIFICANT CHANGE UP (ref 0–8)
GLUCOSE SERPL-MCNC: 117 MG/DL — HIGH (ref 70–99)
HCT VFR BLD CALC: 38.3 % — LOW (ref 42–52)
HGB BLD-MCNC: 13.1 G/DL — LOW (ref 14–18)
IMM GRANULOCYTES NFR BLD AUTO: 0.6 % — HIGH (ref 0.1–0.3)
LEGIONELLA AG UR QL: NEGATIVE — SIGNIFICANT CHANGE UP
LYMPHOCYTES # BLD AUTO: 1.31 K/UL — SIGNIFICANT CHANGE UP (ref 1.2–3.4)
LYMPHOCYTES # BLD AUTO: 14 % — LOW (ref 20.5–51.1)
MCHC RBC-ENTMCNC: 31.3 PG — HIGH (ref 27–31)
MCHC RBC-ENTMCNC: 34.2 G/DL — SIGNIFICANT CHANGE UP (ref 32–37)
MCV RBC AUTO: 91.6 FL — SIGNIFICANT CHANGE UP (ref 80–94)
MONOCYTES # BLD AUTO: 0.63 K/UL — HIGH (ref 0.1–0.6)
MONOCYTES NFR BLD AUTO: 6.7 % — SIGNIFICANT CHANGE UP (ref 1.7–9.3)
NEUTROPHILS # BLD AUTO: 7.21 K/UL — HIGH (ref 1.4–6.5)
NEUTROPHILS NFR BLD AUTO: 76.8 % — HIGH (ref 42.2–75.2)
NRBC # BLD: 0 /100 WBCS — SIGNIFICANT CHANGE UP (ref 0–0)
OSMOLALITY UR: 498 MOS/KG — SIGNIFICANT CHANGE UP (ref 50–1400)
PLATELET # BLD AUTO: 117 K/UL — LOW (ref 130–400)
PMV BLD: 10.4 FL — SIGNIFICANT CHANGE UP (ref 7.4–10.4)
POTASSIUM SERPL-MCNC: 3.9 MMOL/L — SIGNIFICANT CHANGE UP (ref 3.5–5)
POTASSIUM SERPL-SCNC: 3.9 MMOL/L — SIGNIFICANT CHANGE UP (ref 3.5–5)
PROT SERPL-MCNC: 5.6 G/DL — LOW (ref 6–8)
RAPID RVP RESULT: SIGNIFICANT CHANGE UP
RBC # BLD: 4.18 M/UL — LOW (ref 4.7–6.1)
RBC # FLD: 12.9 % — SIGNIFICANT CHANGE UP (ref 11.5–14.5)
S PNEUM AG UR QL: NEGATIVE — SIGNIFICANT CHANGE UP
SARS-COV-2 RNA SPEC QL NAA+PROBE: SIGNIFICANT CHANGE UP
SODIUM SERPL-SCNC: 139 MMOL/L — SIGNIFICANT CHANGE UP (ref 135–146)
WBC # BLD: 9.39 K/UL — SIGNIFICANT CHANGE UP (ref 4.8–10.8)
WBC # FLD AUTO: 9.39 K/UL — SIGNIFICANT CHANGE UP (ref 4.8–10.8)

## 2024-10-03 PROCEDURE — 99232 SBSQ HOSP IP/OBS MODERATE 35: CPT

## 2024-10-03 PROCEDURE — 93306 TTE W/DOPPLER COMPLETE: CPT | Mod: 26

## 2024-10-03 RX ORDER — CEFAZOLIN SODIUM 1 G
2000 VIAL (EA) INJECTION EVERY 8 HOURS
Refills: 0 | Status: DISCONTINUED | OUTPATIENT
Start: 2024-10-03 | End: 2024-10-04

## 2024-10-03 RX ADMIN — LOSARTAN POTASSIUM 25 MILLIGRAM(S): 100 TABLET, FILM COATED ORAL at 05:08

## 2024-10-03 RX ADMIN — CHLORHEXIDINE GLUCONATE ORAL RINSE 1 APPLICATION(S): 1.2 SOLUTION DENTAL at 05:08

## 2024-10-03 RX ADMIN — Medication 100 MILLIGRAM(S): at 17:02

## 2024-10-03 RX ADMIN — Medication 5000 UNIT(S): at 17:02

## 2024-10-03 RX ADMIN — Medication 5000 UNIT(S): at 05:08

## 2024-10-03 RX ADMIN — Medication 0.4 MILLIGRAM(S): at 21:27

## 2024-10-03 RX ADMIN — GABAPENTIN 300 MILLIGRAM(S): 800 TABLET, FILM COATED ORAL at 13:13

## 2024-10-03 RX ADMIN — GABAPENTIN 300 MILLIGRAM(S): 800 TABLET, FILM COATED ORAL at 05:09

## 2024-10-03 RX ADMIN — Medication 100 MILLIGRAM(S): at 21:27

## 2024-10-03 RX ADMIN — FUROSEMIDE 40 MILLIGRAM(S): 10 INJECTION INTRAVENOUS at 05:09

## 2024-10-03 RX ADMIN — GABAPENTIN 300 MILLIGRAM(S): 800 TABLET, FILM COATED ORAL at 21:27

## 2024-10-03 NOTE — PHARMACOTHERAPY INTERVENTION NOTE - COMMENTS
Updated patient's penicillin allergy that patient tolerated ceftriaxone during 10/2024 admission.
Consistent with ID note, recommended to change ceftriaxone to cefazolin 2g IV q8h.    Gabbi Romano, PharmD, BCIDP  Clinical Pharmacy Specialist, Infectious Diseases  Tele-Antimicrobial Stewardship Program (Tele-ASP)  Tele-ASP Phone: [phone]  
Recommended to discontinue azithromycin as legionella UA is negative.

## 2024-10-03 NOTE — PHYSICAL THERAPY INITIAL EVALUATION ADULT - GENERAL OBSERVATIONS, REHAB EVAL
DWAYNE Riggs was aware. Pt agreed to be seen. Pt was found laying in bed in semi arreguin position in no apparent distress. +hep lock

## 2024-10-03 NOTE — PHYSICAL THERAPY INITIAL EVALUATION ADULT - ADDITIONAL COMMENTS
Pt lives in a private home with 2 [place]ADE[place] with rails on both sides. There are 11 stairs with rails on both sides inside the house. Pt lives with his wife and stated he did not use an assistive device. He drives.

## 2024-10-03 NOTE — PHYSICAL THERAPY INITIAL EVALUATION ADULT - PERTINENT HX OF CURRENT PROBLEM, REHAB EVAL
Patient is an 82yo male w/ pmhx of HTN, HLD, CAD s/p stent, chronic lymphedema, peripheral neuropathy, JA - not on CPAP, BPH, brought in by daughter for chills, weakness, and worsening lower extremity swelling w/ erythema being admitted for sepsis 2/2 to cellulitis

## 2024-10-04 ENCOUNTER — TRANSCRIPTION ENCOUNTER (OUTPATIENT)
Age: 84
End: 2024-10-04

## 2024-10-04 VITALS
DIASTOLIC BLOOD PRESSURE: 83 MMHG | TEMPERATURE: 99 F | RESPIRATION RATE: 20 BRPM | HEART RATE: 89 BPM | SYSTOLIC BLOOD PRESSURE: 137 MMHG

## 2024-10-04 LAB
ALBUMIN SERPL ELPH-MCNC: 3.6 G/DL — SIGNIFICANT CHANGE UP (ref 3.5–5.2)
ALP SERPL-CCNC: 66 U/L — SIGNIFICANT CHANGE UP (ref 30–115)
ALT FLD-CCNC: 18 U/L — SIGNIFICANT CHANGE UP (ref 0–41)
ANION GAP SERPL CALC-SCNC: 11 MMOL/L — SIGNIFICANT CHANGE UP (ref 7–14)
AST SERPL-CCNC: 21 U/L — SIGNIFICANT CHANGE UP (ref 0–41)
BASOPHILS # BLD AUTO: 0.02 K/UL — SIGNIFICANT CHANGE UP (ref 0–0.2)
BASOPHILS NFR BLD AUTO: 0.3 % — SIGNIFICANT CHANGE UP (ref 0–1)
BILIRUB SERPL-MCNC: 0.7 MG/DL — SIGNIFICANT CHANGE UP (ref 0.2–1.2)
BUN SERPL-MCNC: 16 MG/DL — SIGNIFICANT CHANGE UP (ref 10–20)
CALCIUM SERPL-MCNC: 8.5 MG/DL — SIGNIFICANT CHANGE UP (ref 8.4–10.5)
CHLORIDE SERPL-SCNC: 98 MMOL/L — SIGNIFICANT CHANGE UP (ref 98–110)
CO2 SERPL-SCNC: 29 MMOL/L — SIGNIFICANT CHANGE UP (ref 17–32)
CREAT SERPL-MCNC: 1.4 MG/DL — SIGNIFICANT CHANGE UP (ref 0.7–1.5)
EGFR: 50 ML/MIN/1.73M2 — LOW
EOSINOPHIL # BLD AUTO: 0.18 K/UL — SIGNIFICANT CHANGE UP (ref 0–0.7)
EOSINOPHIL NFR BLD AUTO: 2.4 % — SIGNIFICANT CHANGE UP (ref 0–8)
GLUCOSE SERPL-MCNC: 131 MG/DL — HIGH (ref 70–99)
HCT VFR BLD CALC: 41 % — LOW (ref 42–52)
HGB BLD-MCNC: 14.1 G/DL — SIGNIFICANT CHANGE UP (ref 14–18)
IMM GRANULOCYTES NFR BLD AUTO: 0.7 % — HIGH (ref 0.1–0.3)
LYMPHOCYTES # BLD AUTO: 1.32 K/UL — SIGNIFICANT CHANGE UP (ref 1.2–3.4)
LYMPHOCYTES # BLD AUTO: 17.4 % — LOW (ref 20.5–51.1)
MCHC RBC-ENTMCNC: 31.7 PG — HIGH (ref 27–31)
MCHC RBC-ENTMCNC: 34.4 G/DL — SIGNIFICANT CHANGE UP (ref 32–37)
MCV RBC AUTO: 92.1 FL — SIGNIFICANT CHANGE UP (ref 80–94)
MONOCYTES # BLD AUTO: 0.58 K/UL — SIGNIFICANT CHANGE UP (ref 0.1–0.6)
MONOCYTES NFR BLD AUTO: 7.6 % — SIGNIFICANT CHANGE UP (ref 1.7–9.3)
NEUTROPHILS # BLD AUTO: 5.44 K/UL — SIGNIFICANT CHANGE UP (ref 1.4–6.5)
NEUTROPHILS NFR BLD AUTO: 71.6 % — SIGNIFICANT CHANGE UP (ref 42.2–75.2)
NRBC # BLD: 0 /100 WBCS — SIGNIFICANT CHANGE UP (ref 0–0)
PLATELET # BLD AUTO: 124 K/UL — LOW (ref 130–400)
PMV BLD: 10.2 FL — SIGNIFICANT CHANGE UP (ref 7.4–10.4)
POTASSIUM SERPL-MCNC: 3.6 MMOL/L — SIGNIFICANT CHANGE UP (ref 3.5–5)
POTASSIUM SERPL-SCNC: 3.6 MMOL/L — SIGNIFICANT CHANGE UP (ref 3.5–5)
PROT SERPL-MCNC: 6 G/DL — SIGNIFICANT CHANGE UP (ref 6–8)
RBC # BLD: 4.45 M/UL — LOW (ref 4.7–6.1)
RBC # FLD: 12.6 % — SIGNIFICANT CHANGE UP (ref 11.5–14.5)
SODIUM SERPL-SCNC: 138 MMOL/L — SIGNIFICANT CHANGE UP (ref 135–146)
WBC # BLD: 7.59 K/UL — SIGNIFICANT CHANGE UP (ref 4.8–10.8)
WBC # FLD AUTO: 7.59 K/UL — SIGNIFICANT CHANGE UP (ref 4.8–10.8)

## 2024-10-04 PROCEDURE — 99239 HOSP IP/OBS DSCHRG MGMT >30: CPT

## 2024-10-04 RX ORDER — ATENOLOL 25 MG/1
1 TABLET ORAL
Refills: 0 | DISCHARGE

## 2024-10-04 RX ADMIN — FUROSEMIDE 40 MILLIGRAM(S): 10 INJECTION INTRAVENOUS at 05:02

## 2024-10-04 RX ADMIN — Medication 100 MILLIGRAM(S): at 05:02

## 2024-10-04 RX ADMIN — Medication 100 MILLIGRAM(S): at 13:39

## 2024-10-04 RX ADMIN — LOSARTAN POTASSIUM 25 MILLIGRAM(S): 100 TABLET, FILM COATED ORAL at 05:02

## 2024-10-04 RX ADMIN — Medication 5000 UNIT(S): at 05:02

## 2024-10-04 RX ADMIN — CHLORHEXIDINE GLUCONATE ORAL RINSE 1 APPLICATION(S): 1.2 SOLUTION DENTAL at 05:02

## 2024-10-04 RX ADMIN — GABAPENTIN 300 MILLIGRAM(S): 800 TABLET, FILM COATED ORAL at 05:02

## 2024-10-04 RX ADMIN — GABAPENTIN 300 MILLIGRAM(S): 800 TABLET, FILM COATED ORAL at 13:40

## 2024-10-04 NOTE — DISCHARGE NOTE PROVIDER - CARE PROVIDER_API CALL
Damon Kim Shlomo  Internal Medicine  [address] [place] [address] [place] [address]  Phone: [phone]  Fax: [phone])-  Follow Up Time:

## 2024-10-04 NOTE — PROGRESS NOTE ADULT - SUBJECTIVE AND OBJECTIVE BOX
82yo male w/ pmhx of HTN, HLD, CAD s/p stent, chronic lymphedema, peripheral neuropathy, JA - not on CPAP, BPH, brought in by daughter for chills, weakness, and worsening lower extremity swelling w/ erythema being admitted for sepsis 2/2 to Cellulitis     Today:  Seen at bedside, no new complaints.              REVIEW OF SYSTEMS:  no new complaints      MEDICATIONS  (STANDING):  cefTRIAXone   IVPB 1000 milliGRAM(s) IV Intermittent every 24 hours  chlorhexidine 2% Cloths 1 Application(s) Topical <User Schedule>  furosemide    Tablet 40 milliGRAM(s) Oral daily  gabapentin 300 milliGRAM(s) Oral every 8 hours  heparin   Injectable 5000 Unit(s) SubCutaneous every 12 hours  influenza  Vaccine (HIGH DOSE) 0.5 milliLiter(s) IntraMuscular once  lactated ringers Bolus 1000 milliLiter(s) IV Bolus once  losartan 25 milliGRAM(s) Oral daily  tamsulosin 0.4 milliGRAM(s) Oral at bedtime    MEDICATIONS  (PRN):  acetaminophen     Tablet .. 650 milliGRAM(s) Oral every 6 hours PRN Temp greater or equal to 38C (100.4F), Mild Pain (1 - 3)  aluminum hydroxide/magnesium hydroxide/simethicone Suspension 30 milliLiter(s) Oral every 4 hours PRN Dyspepsia  melatonin 3 milliGRAM(s) Oral at bedtime PRN Insomnia  ondansetron Injectable 4 milliGRAM(s) IV Push every 8 hours PRN Nausea and/or Vomiting      Allergies  penicillins (Other)          Vital Signs Last 24 Hrs  T(C): 36.8 (03 Oct 2024 14:09), Max: 37.8 (02 Oct 2024 18:05)  T(F): 98.2 (03 Oct 2024 14:09), Max: 100.1 (02 Oct 2024 18:05)  HR: 108 (03 Oct 2024 14:09) (102 - 108)  BP: 133/86 (03 Oct 2024 14:09) (119/68 - 149/81)  RR: 18 (03 Oct 2024 14:09) (18 - 18)  SpO2: 96% (03 Oct 2024 14:09) (95% - 96%)    Parameters below as of 03 Oct 2024 14:09  Patient On (Oxygen Delivery Method): room air        PHYSICAL EXAM:  GENERAL:  Male NAD, resting comfortably.  EYES: EOMI, conjunctiva and sclera clear  CHEST/LUNG: Clear to auscultation bilaterally; No wheeze, rhonchi, or rales  HEART: Regular rate and rhythm; S1&S2  ABDOMEN: Soft, Nontender, Nondistended x 4 quadrants; Bowel sounds present  EXTREMITIES:   Peripheral Pulses Present, B/L LE swelling L>R, 2+ pitting edema b/l, mild ttp LLE w/ surrounding erythema   PSYCH: AAOx3, cooperative, appropriate      LABS:                        13.1   9.39  )-----------( 117      ( 03 Oct 2024 05:54 )             38.3     10-03    139  |  99  |  19  ----------------------------<  117[H]  3.9   |  26  |  1.4    Ca    8.4      03 Oct 2024 05:54    TPro  5.6[L]  /  Alb  3.4[L]  /  TBili  0.8  /  DBili  x   /  AST  24  /  ALT  17  /  AlkPhos  73  10-03      Urinalysis Basic - ( 03 Oct 2024 05:54 )    Color: x / Appearance: x / SG: x / pH: x  Gluc: 117 mg/dL / Ketone: x  / Bili: x / Urobili: x   Blood: x / Protein: x / Nitrite: x   Leuk Esterase: x / RBC: x / WBC x   Sq Epi: x / Non Sq Epi: x / Bacteria: x      
82yo male w/ pmhx of HTN, HLD, CAD s/p stent, chronic lymphedema, peripheral neuropathy, JA - not on CPAP, BPH, brought in by daughter for chills, weakness, and worsening lower extremity swelling w/ erythema being admitted for sepsis 2/2 to Cellulitis     Today:  Seen at bedside, no new complaints.              REVIEW OF SYSTEMS:  Cough  Leg swelling      MEDICATIONS  (STANDING):  azithromycin  IVPB 500 milliGRAM(s) IV Intermittent every 24 hours  cefTRIAXone   IVPB 1000 milliGRAM(s) IV Intermittent every 24 hours  chlorhexidine 2% Cloths 1 Application(s) Topical <User Schedule>  gabapentin 300 milliGRAM(s) Oral every 8 hours  heparin   Injectable 5000 Unit(s) SubCutaneous every 12 hours  influenza  Vaccine (HIGH DOSE) 0.5 milliLiter(s) IntraMuscular once  lactated ringers Bolus 1000 milliLiter(s) IV Bolus once  tamsulosin 0.4 milliGRAM(s) Oral at bedtime    MEDICATIONS  (PRN):  acetaminophen     Tablet .. 650 milliGRAM(s) Oral every 6 hours PRN Temp greater or equal to 38C (100.4F), Mild Pain (1 - 3)  aluminum hydroxide/magnesium hydroxide/simethicone Suspension 30 milliLiter(s) Oral every 4 hours PRN Dyspepsia  melatonin 3 milliGRAM(s) Oral at bedtime PRN Insomnia  ondansetron Injectable 4 milliGRAM(s) IV Push every 8 hours PRN Nausea and/or Vomiting      Allergies  penicillins (Other)          Vital Signs Last 24 Hrs  T(C): 38.6 (02 Oct 2024 13:56), Max: 38.6 (02 Oct 2024 13:56)  T(F): 101.5 (02 Oct 2024 13:56), Max: 101.5 (02 Oct 2024 13:56)  HR: 109 (02 Oct 2024 13:56) (91 - 122)  BP: 133/71 (02 Oct 2024 13:56) (107/66 - 143/69)  RR: 18 (02 Oct 2024 04:47) (17 - 18)  SpO2: 95% (02 Oct 2024 04:47) (95% - 96%)    Parameters below as of 02 Oct 2024 04:47  Patient On (Oxygen Delivery Method): room air        PHYSICAL EXAM:  GENERAL:  Male NAD, resting comfortably.  EYES: EOMI, conjunctiva and sclera clear  CHEST/LUNG: Clear to auscultation bilaterally; No wheeze, rhonchi, or rales  HEART: Regular rate and rhythm; S1&S2  ABDOMEN: Soft, Nontender, Nondistended x 4 quadrants; Bowel sounds present  EXTREMITIES:   Peripheral Pulses Present, B/L LE swelling L>R, 2+ pitting edema b/l, mild ttp LLE w/ surrounding erythema   PSYCH: AAOx3, cooperative, appropriate      LABS:                        13.8   13.58 )-----------( 125      ( 02 Oct 2024 05:53 )             40.8     10-02    138  |  101  |  23[H]  ----------------------------<  125[H]  4.6   |  26  |  1.6[H]    Ca    8.5      02 Oct 2024 05:53    TPro  5.9[L]  /  Alb  3.4[L]  /  TBili  1.1  /  DBili  x   /  AST  27  /  ALT  20  /  AlkPhos  87  10-02    PT/INR - ( 01 Oct 2024 11:40 )   PT: 13.20 sec;   INR: 1.16 ratio         PTT - ( 01 Oct 2024 11:40 )  PTT:25.8 sec  Urinalysis Basic - ( 02 Oct 2024 05:53 )    Color: x / Appearance: x / SG: x / pH: x  Gluc: 125 mg/dL / Ketone: x  / Bili: x / Urobili: x   Blood: x / Protein: x / Nitrite: x   Leuk Esterase: x / RBC: x / WBC x   Sq Epi: x / Non Sq Epi: x / Bacteria: x      
INFECTIOUS DISEASE FOLLOW UP NOTE:    Interval History/ROS: Patient is a 83y old  Male who presents with a chief complaint of LLE cellulitis (03 Oct 2024 13:54)    Overnight events: Afebrile overnight. LLE is improving. No more cough.    REVIEW OF SYSTEMS:  CONSTITUTIONAL: No fever or chills  HEAD: No lesion on scalp  EYES: No visual disturbance  ENT: No sore throat  RESPIRATORY: + non productive cough, no shortness of breath  CARDIOVASCULAR: No chest pain or palpitations  GASTROINTESTINAL: No abdominal or epigastric pain; + bilateral LE swelling  GENITOURINARY: No dysuria  NEUROLOGICAL: No headache/dizziness  MUSCULOSKELETAL: No joint pain, erythema, or swelling; no back pain  SKIN: No itching, rashes  All other ROS negative except noted above      Prior hospital charts reviewed [Yes]  Primary team notes reviewed [Yes]  Other consultant notes reviewed [Yes]    Allergies:  penicillins (Other)      ANTIMICROBIALS:   ceFAZolin   IVPB 2000 every 8 hours      OTHER MEDS: MEDICATIONS  (STANDING):  acetaminophen     Tablet .. 650 every 6 hours PRN  aluminum hydroxide/magnesium hydroxide/simethicone Suspension 30 every 4 hours PRN  furosemide    Tablet 40 daily  gabapentin 300 every 8 hours  heparin   Injectable 5000 every 12 hours  influenza  Vaccine (HIGH DOSE) 0.5 once  losartan 25 daily  melatonin 3 at bedtime PRN  ondansetron Injectable 4 every 8 hours PRN  tamsulosin 0.4 at bedtime      Vital Signs Last 24 Hrs  T(F): 97.9 (10-04-24 @ 04:35), Max: 101.7 (10-01-24 @ 11:36)    Vital Signs Last 24 Hrs  HR: 93 (10-04-24 @ 04:35) (93 - 108)  BP: 152/84 (10-04-24 @ 04:35) (125/69 - 152/84)  RR: 18 (10-04-24 @ 04:35)  SpO2: 95% (10-03-24 @ 21:02) (95% - 96%)  Wt(kg): --    EXAM:  GENERAL: NAD, lying in bed  HEAD: No head lesions  EYES: Conjunctiva pink and cornea white  EAR, NOSE, MOUTH, THROAT: Normal external ears and nose, no discharges; moist mucous membranes  NECK: Supple, nontender to palpation; no JVD  RESPIRATORY: Bibasilar crackles  CARDIOVASCULAR: S1 S2  GASTROINTESTINAL: Soft, nontender, nondistended; normoactive bowel sounds  GENITOURINARY: No bowling catheter, no CVA tenderness  EXTREMITIES: No clubbing, cyanosis, + bilateral LE edema  NERVOUS SYSTEM: Alert and oriented to person, time, place and situation, speech clear. No focal deficits   MUSCULOSKELETAL: No joint erythema, swelling or pain  SKIN: Erythema and swelling on bilateral LEs, L > right. No significant tenderness. No active wound. no superficial thrombophlebitis  PSYCH: Normal affect    Labs:                        14.1   7.59  )-----------( 124      ( 04 Oct 2024 06:54 )             41.0     10-04    138  |  98  |  16  ----------------------------<  131[H]  3.6   |  29  |  1.4    Ca    8.5      04 Oct 2024 06:54    TPro  6.0  /  Alb  3.6  /  TBili  0.7  /  DBili  x   /  AST  21  /  ALT  18  /  AlkPhos  66  10-04      WBC Trend:  WBC Count: 7.59 (10-04-24 @ 06:54)  WBC Count: 9.39 (10-03-24 @ 05:54)  WBC Count: 13.58 (10-02-24 @ 05:53)  WBC Count: 10.36 (10-01-24 @ 11:40)      Creatine Trend:  Creatinine: 1.4 (10-04)  Creatinine: 1.4 (10-03)  Creatinine: 1.6 (10-02)  Creatinine: 1.8 (10-01)      Liver Biochemical Testing Trend:  Alanine Aminotransferase (ALT/SGPT): 18 (10-04)  Alanine Aminotransferase (ALT/SGPT): 17 (10-03)  Alanine Aminotransferase (ALT/SGPT): 20 (10-02)  Alanine Aminotransferase (ALT/SGPT): 28 (10-01)  Aspartate Aminotransferase (AST/SGOT): 21 (10-04-24 @ 06:54)  Aspartate Aminotransferase (AST/SGOT): 24 (10-03-24 @ 05:54)  Aspartate Aminotransferase (AST/SGOT): 27 (10-02-24 @ 05:53)  Aspartate Aminotransferase (AST/SGOT): 27 (10-01-24 @ 11:40)  Bilirubin Total: 0.7 (10-04)  Bilirubin Total: 0.8 (10-03)  Bilirubin Total: 1.1 (10-02)  Bilirubin Total: 1.1 (10-01)      Trend LDH      Urinalysis Basic - ( 04 Oct 2024 06:54 )    Color: x / Appearance: x / SG: x / pH: x  Gluc: 131 mg/dL / Ketone: x  / Bili: x / Urobili: x   Blood: x / Protein: x / Nitrite: x   Leuk Esterase: x / RBC: x / WBC x   Sq Epi: x / Non Sq Epi: x / Bacteria: x        MICROBIOLOGY:    MRSA PCR Result.: Negative (10-02-24 @ 18:45)      Urinalysis with Rflx Culture (collected 01 Oct 2024 15:50)    Culture - Blood (collected 01 Oct 2024 11:40)  Source: .Blood BLOOD  Preliminary Report:    No growth at 48 Hours    Culture - Blood (collected 01 Oct 2024 11:40)  Source: .Blood BLOOD  Preliminary Report:    No growth at 48 Hours    Rapid RVP Result: NotDetec (10-02 @ 18:45)  Legionella Antigen, Urine: Negative (10-02 @ 18:45)    Lactate, Blood: 2.4 (10-02 @ 05:53)  Lactate, Blood: 3.9 (10-01 @ 19:14)  Lactate, Blood: 4.6 (10-01 @ 18:00)  Lactate, Blood: 6.0 (10-01 @ 12:49)      RADIOLOGY:  imaging below personally reviewed      < from: [facility] Duplex Lower Ext Vein Scan, Bilat (10.01.24 @ 13:57) >  FINDINGS:    RIGHT:  Normal compressibility of the RIGHT common femoral, femoral and popliteal   veins.  Doppler examination shows normal spontaneous and phasic flow.  No RIGHT calf vein thrombosis is detected. Peroneal vein not visualized.    LEFT:  Normal compressibility of the LEFT common femoral, femoral and popliteal   veins.  Doppler examination shows normal spontaneous and phasic flow.  No LEFT calf vein thrombosis is detected. Peroneal vein not visualized.    IMPRESSION:  No evidence of deep venous thrombosis in either lower extremity.    < end of copied text >    
INFECTIOUS DISEASE FOLLOW UP NOTE:    Interval History/ROS: Patient is a 83y old  Male who presents with a chief complaint of Cellulitis (02 Oct 2024 09:15)    Overnight events: Febrile overnight. No significant complaints this morning. LLE is still erythematous and warm    REVIEW OF SYSTEMS:  CONSTITUTIONAL: No fever or chills  HEAD: No lesion on scalp  EYES: No visual disturbance  ENT: No sore throat  RESPIRATORY: + non productive cough, no shortness of breath  CARDIOVASCULAR: No chest pain or palpitations  GASTROINTESTINAL: No abdominal or epigastric pain; + bilateral LE swelling  GENITOURINARY: No dysuria  NEUROLOGICAL: No headache/dizziness  MUSCULOSKELETAL: No joint pain, erythema, or swelling; no back pain  SKIN: No itching, rashes  All other ROS negative except noted above    Prior hospital charts reviewed [Yes]  Primary team notes reviewed [Yes]  Other consultant notes reviewed [Yes]    Allergies:  penicillins (Other)      ANTIMICROBIALS:   cefTRIAXone   IVPB 1000 every 24 hours      OTHER MEDS: MEDICATIONS  (STANDING):  acetaminophen     Tablet .. 650 every 6 hours PRN  aluminum hydroxide/magnesium hydroxide/simethicone Suspension 30 every 4 hours PRN  furosemide    Tablet 40 daily  gabapentin 300 every 8 hours  heparin   Injectable 5000 every 12 hours  influenza  Vaccine (HIGH DOSE) 0.5 once  losartan 25 daily  melatonin 3 at bedtime PRN  ondansetron Injectable 4 every 8 hours PRN  tamsulosin 0.4 at bedtime      Vital Signs Last 24 Hrs  T(F): 98.3 (10-03-24 @ 10:14), Max: 101.7 (10-01-24 @ 11:36)    Vital Signs Last 24 Hrs  HR: 102 (10-03-24 @ 05:14) (102 - 109)  BP: 119/68 (10-03-24 @ 05:14) (119/68 - 149/81)  RR: 18 (10-03-24 @ 05:14)  SpO2: 95% (10-02-24 @ 20:44) (95% - 95%)  Wt(kg): --    EXAM:  GENERAL: NAD, lying in bed  HEAD: No head lesions  EYES: Conjunctiva pink and cornea white  EAR, NOSE, MOUTH, THROAT: Normal external ears and nose, no discharges; moist mucous membranes  NECK: Supple, nontender to palpation; no JVD  RESPIRATORY: Bibasilar crackles  CARDIOVASCULAR: S1 S2  GASTROINTESTINAL: Soft, nontender, nondistended; normoactive bowel sounds  GENITOURINARY: No bowling catheter, no CVA tenderness  EXTREMITIES: No clubbing, cyanosis, + bilateral LE edema  NERVOUS SYSTEM: Alert and oriented to person, time, place and situation, speech clear. No focal deficits   MUSCULOSKELETAL: No joint erythema, swelling or pain  SKIN: Erythema and swelling on bilateral LEs, L > right. No significant tenderness. No active wound. no superficial thrombophlebitis  PSYCH: Normal affect    Labs:                        13.1   9.39  )-----------( 117      ( 03 Oct 2024 05:54 )             38.3     10-03    139  |  99  |  19  ----------------------------<  117[H]  3.9   |  26  |  1.4    Ca    8.4      03 Oct 2024 05:54    TPro  5.6[L]  /  Alb  3.4[L]  /  TBili  0.8  /  DBili  x   /  AST  24  /  ALT  17  /  AlkPhos  73  10-03      WBC Trend:  WBC Count: 9.39 (10-03-24 @ 05:54)  WBC Count: 13.58 (10-02-24 @ 05:53)  WBC Count: 10.36 (10-01-24 @ 11:40)      Creatine Trend:  Creatinine: 1.4 (10-03)  Creatinine: 1.6 (10-02)  Creatinine: 1.8 (10-01)      Liver Biochemical Testing Trend:  Alanine Aminotransferase (ALT/SGPT): 17 (10-03)  Alanine Aminotransferase (ALT/SGPT): 20 (10-02)  Alanine Aminotransferase (ALT/SGPT): 28 (10-01)  Aspartate Aminotransferase (AST/SGOT): 24 (10-03-24 @ 05:54)  Aspartate Aminotransferase (AST/SGOT): 27 (10-02-24 @ 05:53)  Aspartate Aminotransferase (AST/SGOT): 27 (10-01-24 @ 11:40)  Bilirubin Total: 0.8 (10-03)  Bilirubin Total: 1.1 (10-02)  Bilirubin Total: 1.1 (10-01)      Trend LDH      Urinalysis Basic - ( 03 Oct 2024 05:54 )    Color: x / Appearance: x / SG: x / pH: x  Gluc: 117 mg/dL / Ketone: x  / Bili: x / Urobili: x   Blood: x / Protein: x / Nitrite: x   Leuk Esterase: x / RBC: x / WBC x   Sq Epi: x / Non Sq Epi: x / Bacteria: x        MICROBIOLOGY:    MRSA PCR Result.: Negative (10-02-24 @ 18:45)      Urinalysis with Rflx Culture (collected 01 Oct 2024 15:50)    Culture - Blood (collected 01 Oct 2024 11:40)  Source: .Blood BLOOD  Preliminary Report:    No growth at 24 hours    Culture - Blood (collected 01 Oct 2024 11:40)  Source: .Blood BLOOD  Preliminary Report:    No growth at 24 hours    Rapid RVP Result: NotDetec (10-02 @ 18:45)  Legionella Antigen, Urine: Negative (10-02 @ 18:45)      Lactate, Blood: 2.4 (10-02 @ 05:53)  Lactate, Blood: 3.9 (10-01 @ 19:14)  Lactate, Blood: 4.6 (10-01 @ 18:00)  Lactate, Blood: 6.0 (10-01 @ 12:49)      RADIOLOGY:  imaging below personally reviewed    < from: [facility] Duplex Lower Ext Vein Scan, Bilat (10.01.24 @ 13:57) >  FINDINGS:    RIGHT:  Normal compressibility of the RIGHT common femoral, femoral and popliteal   veins.  Doppler examination shows normal spontaneous and phasic flow.  No RIGHT calf vein thrombosis is detected. Peroneal vein not visualized.    LEFT:  Normal compressibility of the LEFT common femoral, femoral and popliteal   veins.  Doppler examination shows normal spontaneous and phasic flow.  No LEFT calf vein thrombosis is detected. Peroneal vein not visualized.    IMPRESSION:  No evidence of deep venous thrombosis in either lower extremity.    < end of copied text >    < from: Xray Chest 1 View-PORTABLE IMMEDIATE (10.01.24 @ 12:13) >  Impression:    Bibasilar opacities.        < end of copied text >

## 2024-10-04 NOTE — PROGRESS NOTE ADULT - ASSESSMENT
82yo male w/ pmhx of HTN, HLD, CAD s/p stent, chronic lymphedema, peripheral neuropathy, JA - not on CPAP, BPH, brought in by daughter for chills, weakness, and worsening lower extremity swelling w/ erythema.     ID is following for cellulitis  Febrile to 101.7, WBC 10.36 > 13.58 > 9.39  On room air  COVID/flu/RSV negative  UA no pyuria  BCx NGTD  MRSA nare PCR negative      Duplex LE no DVT  CXR with bibasilar opacities    Antibiotics:  Vancomycin 10/1 - 10/2  Cefepime 10/1 - 10/2  Ceftriaxone 10/2 ->    IMPRESSION:  LLE cellulitis  Venous stasis dermatitis  Lactic acidosis  Chronic bilateral LE lymphedema  CAD  JA    RECOMMENDATIONS:  - Respiratory symptoms are mostly gone; LLE remains erythematous, swollen  - Continue IV Ancef 2g q8hrs for now (aware of penicillin allergy, patient tolerated ceftriaxone, can monitor for 1st dose reaction)  - On discharge, can switch to PO Ceftin 500mg q12hrs to complete 10-day treatment (until 10/10)  - Follow up BCx  - Leg elevation and topical steroid for LLE  - Offloading and frequent position changes, aspiration precaution  - Trend WBC, fever curve, transaminases, creatinine daily      Opal Lopes D.O.  Attending Physician  Division of Infectious Diseases  [facility] - [facility]  Please contact me via Microsoft Teams    
84yo male w/ pmhx of HTN, HLD, CAD s/p stent, chronic lymphedema, peripheral neuropathy, JA - not on CPAP, BPH, brought in by daughter for chills, weakness, and worsening lower extremity swelling w/ erythema being admitted for sepsis 2/2 to Cellulitis     #Sepsis likely 2' to PNA   #LLE Cellulitis   - Temp 101, , WBC 10.36 w/ bands, Lactate 3.7 > 6.0  - B/l LE Duplex - No evidence of deep venous thrombosis in either lower extremity.  - IV abx - Rocephin and Zithro  - s/p 3400ml IVF Bolus  - FU BCx  - Tylenol prn for fever  - Monitor WBC   - ID Consult appreciated    #ARCHIE  - Cr 1.8  - Last Cr in [facility] in 2016 - 1.27  - NS @100ml/hr x 12 hrs   - Trend RF  - Urine lytes, AM Labs    #Chronic Lymphedema   #HTN  - continue losartan, Lasix  - c/w spironolactone 25mg QD   - I&Os  - Daily weights   - Monitor BP   - Keep LE elevated     #HLD  - c/w Lipitor 20mg QD    #Peripheral Neuropathy   - Reports taking 1200mg of Gabapentin in am, will renally adjust it to 300 Q8 in setting of ARCHIE     #BPH  - c/w Flomax     Diet: DASH   Activity: AAT   VTE PPX: SubCut Heparin   Dispo: acute from home, septic
84yo male w/ pmhx of HTN, HLD, CAD s/p stent, chronic lymphedema, peripheral neuropathy, JA - not on CPAP, BPH, brought in by daughter for chills, weakness, and worsening lower extremity swelling w/ erythema being admitted for sepsis 2/2 to Cellulitis     #Sepsis likely 2' to PNA   #LLE Cellulitis   - Temp 101, , WBC 10.36 w/ bands, Lactate 3.7 > 6.0  - B/l LE Duplex - No evidence of deep venous thrombosis in either lower extremity.  - IV abx - per ID, switch to Ancef  - s/p 3400ml IVF Bolus  - FU BCx  - Tylenol prn for fever  - Monitor WBC   - ID Consult appreciated    #ARCHIE  - Cr 1.8  - Last Cr in [facility] in 2016 - 1.27  - NS @100ml/hr x 12 hrs   - Trend RF  - Cr improved to 1.4    #Chronic Lymphedema   #HTN  - continue losartan, Lasix  - c/w spironolactone 25mg QD   - I&Os  - Daily weights   - Monitor BP   - Keep LE elevated   -TTE unremarkable    #HLD  - c/w Lipitor 20mg QD    #Peripheral Neuropathy   - Reports taking 1200mg of Gabapentin in am, will renally adjust it to 300 Q8 in setting of ARCHIE     #BPH  - c/w Flomax     Diet: DASH   Activity: AAT   VTE PPX: SubCut Heparin   Dispo: acute from home, septic
84yo male w/ pmhx of HTN, HLD, CAD s/p stent, chronic lymphedema, peripheral neuropathy, JA - not on CPAP, BPH, brought in by daughter for chills, weakness, and worsening lower extremity swelling w/ erythema.     ID is following for cellulitis  Febrile to 101.7, WBC 10.36 > 13.58 > 9.39  On room air  COVID/flu/RSV negative  UA no pyuria  BCx NGTD  MRSA nare PCR negative      Duplex LE no DVT  CXR with bibasilar opacities    Antibiotics:  Vancomycin 10/1 - 10/2  Cefepime 10/1 - 10/2  Ceftriaxone 10/2 ->    IMPRESSION:  LLE cellulitis  Venous stasis dermatitis  Fever  Lactic acidosis  Chronic bilateral LE lymphedema  CAD  JA    RECOMMENDATIONS:  - Respiratory symptoms are mostly gone; LLE remains erythematous, swollen  - D/C azithromycin and ceftriaxone  - Start IV Ancef 2g q8hrs for now (aware of penicillin allergy, patient tolerated ceftriaxone, can monitor for 1st dose reaction)  - Follow up BCx  - Leg elevation and topical steroid for LLE  - Offloading and frequent position changes, aspiration precaution  - Trend WBC, fever curve, transaminases, creatinine daily      Opal Lopes D.O.  Attending Physician  Division of Infectious Diseases  [facility] - [facility]  Please contact me via [employer] Teams

## 2024-10-04 NOTE — DISCHARGE NOTE PROVIDER - HOSPITAL COURSE
82yo male w/ pmhx of HTN, HLD, CAD s/p stent, chronic lymphedema, peripheral neuropathy, JA - not on CPAP, BPH, brought in by daughter for chills, weakness, and worsening lower extremity swelling w/ erythema being admitted for sepsis 2/2 to Cellulitis     #Sepsis likely 2' to PNA   #LLE Cellulitis   - Temp 101, , WBC 10.36 w/ bands, Lactate 3.7 > 6.0  - B/l LE Duplex - No evidence of deep venous thrombosis in either lower extremity.  - BCx neg  - ID Consult appreciated-can DC on Ceftin    #ARCHIE  - Cr 1.8  - Last Cr in [facility] in 2016 - 1.27  - Cr improved to 1.4 with hydration    #Chronic Lymphedema   #HTN  - continue losartan, Lasix  - c/w spironolactone 25mg QD   - Monitor BP   - Keep LE elevated at home  -TTE unremarkable    #HLD  - c/w Lipitor 20mg QD    #Peripheral Neuropathy   - continue gabapentin    #BPH  - c/w Flomax

## 2024-10-04 NOTE — DISCHARGE NOTE PROVIDER - NSDCMRMEDTOKEN_GEN_ALL_CORE_FT
atenolol 25 mg oral tablet: 1 tab(s) orally once a day  Flomax 0.4 mg oral capsule: 1 cap(s) orally once a day  furosemide 40 mg oral tablet: 1 tab(s) orally once a day  gabapentin 600 mg oral tablet: 2 tab(s) orally once a day (in the morning)  losartan 25 mg oral tablet: 1 tab(s) orally once a day  spironolactone 25 mg oral tablet: 1 tab(s) orally once a day   cefuroxime 500 mg oral tablet: 1 tab(s) orally 2 times a day  Flomax 0.4 mg oral capsule: 1 cap(s) orally once a day  furosemide 40 mg oral tablet: 1 tab(s) orally once a day  gabapentin 600 mg oral tablet: 2 tab(s) orally once a day (in the morning)  losartan 25 mg oral tablet: 1 tab(s) orally once a day  spironolactone 25 mg oral tablet: 1 tab(s) orally once a day

## 2024-10-04 NOTE — DISCHARGE NOTE PROVIDER - NSDCCPCAREPLAN_GEN_ALL_CORE_FT
PRINCIPAL DISCHARGE DIAGNOSIS  Diagnosis: Pneumonia  Assessment and Plan of Treatment: Complete antibiotics as prescribed and follow with your primary care doctor.      SECONDARY DISCHARGE DIAGNOSES  Diagnosis: Cellulitis of leg, left  Assessment and Plan of Treatment: Please complete antibiotics as prescribed and follow with your primary care doctor.

## 2024-10-04 NOTE — DISCHARGE NOTE NURSING/CASE MANAGEMENT/SOCIAL WORK - PATIENT PORTAL LINK FT
You can access the FollowMyHealth Patient Portal offered by [facility] by registering at the following website: [email or web address]. By joining [email or web address]’s FollowMyHealth portal, you will also be able to view your health information using other applications (apps) compatible with our system.

## 2024-10-06 LAB
CULTURE RESULTS: SIGNIFICANT CHANGE UP
CULTURE RESULTS: SIGNIFICANT CHANGE UP
SPECIMEN SOURCE: SIGNIFICANT CHANGE UP
SPECIMEN SOURCE: SIGNIFICANT CHANGE UP

## 2024-10-07 ENCOUNTER — TRANSCRIPTION ENCOUNTER (OUTPATIENT)
Age: 84
End: 2024-10-07

## 2024-10-07 PROBLEM — I10 ESSENTIAL (PRIMARY) HYPERTENSION: Chronic | Status: ACTIVE | Noted: 2024-10-01

## 2024-10-07 PROBLEM — N40.0 BENIGN PROSTATIC HYPERPLASIA WITHOUT LOWER URINARY TRACT SYMPTOMS: Chronic | Status: ACTIVE | Noted: 2024-10-01

## 2024-10-07 PROBLEM — E78.5 HYPERLIPIDEMIA, UNSPECIFIED: Chronic | Status: ACTIVE | Noted: 2024-10-01

## 2024-10-07 PROBLEM — I25.10 ATHEROSCLEROTIC HEART DISEASE OF NATIVE CORONARY ARTERY WITHOUT ANGINA PECTORIS: Chronic | Status: ACTIVE | Noted: 2024-10-01

## 2024-10-07 PROBLEM — Z86.69 PERSONAL HISTORY OF OTHER DISEASES OF THE NERVOUS SYSTEM AND SENSE ORGANS: Chronic | Status: ACTIVE | Noted: 2024-10-01

## 2024-10-07 PROBLEM — I89.0 LYMPHEDEMA, NOT ELSEWHERE CLASSIFIED: Chronic | Status: ACTIVE | Noted: 2024-10-01

## 2024-10-08 ENCOUNTER — APPOINTMENT (OUTPATIENT)
Age: 84
End: 2024-10-08
Payer: MEDICARE

## 2024-10-08 VITALS
HEART RATE: 65 BPM | DIASTOLIC BLOOD PRESSURE: 60 MMHG | OXYGEN SATURATION: 94 % | SYSTOLIC BLOOD PRESSURE: 110 MMHG | RESPIRATION RATE: 18 BRPM

## 2024-10-08 DIAGNOSIS — I89.0 LYMPHEDEMA, NOT ELSEWHERE CLASSIFIED: ICD-10-CM

## 2024-10-08 DIAGNOSIS — J18.9 PNEUMONIA, UNSPECIFIED ORGANISM: ICD-10-CM

## 2024-10-08 DIAGNOSIS — L03.90 CELLULITIS, UNSPECIFIED: ICD-10-CM

## 2024-10-08 PROCEDURE — 99348 HOME/RES VST EST LOW MDM 30: CPT

## 2024-10-08 RX ORDER — CEFUROXIME AXETIL 500 MG/1
500 TABLET ORAL
Refills: 0 | Status: ACTIVE | COMMUNITY
Start: 2024-10-08

## 2024-10-08 RX ORDER — SPIRONOLACTONE 25 MG/1
25 TABLET ORAL
Refills: 0 | Status: ACTIVE | COMMUNITY
Start: 2024-10-08

## 2024-10-08 RX ORDER — FUROSEMIDE 40 MG/1
40 TABLET ORAL
Refills: 0 | Status: ACTIVE | COMMUNITY
Start: 2024-10-08

## 2024-10-08 RX ORDER — GABAPENTIN 600 MG/1
600 TABLET, COATED ORAL
Refills: 0 | Status: ACTIVE | COMMUNITY
Start: 2024-10-08

## 2024-10-08 RX ORDER — ATORVASTATIN CALCIUM 20 MG/1
20 TABLET, FILM COATED ORAL
Refills: 0 | Status: ACTIVE | COMMUNITY
Start: 2024-10-08

## 2024-10-08 RX ORDER — LOSARTAN POTASSIUM 25 MG/1
25 TABLET, FILM COATED ORAL
Refills: 0 | Status: ACTIVE | COMMUNITY
Start: 2024-10-08

## 2024-10-09 ENCOUNTER — EMERGENCY (EMERGENCY)
Facility: HOSPITAL | Age: 84
LOS: 0 days | Discharge: HOME | End: 2024-10-09
Attending: EMERGENCY MEDICINE
Payer: MEDICARE

## 2024-10-09 VITALS
HEART RATE: 74 BPM | SYSTOLIC BLOOD PRESSURE: 146 MMHG | DIASTOLIC BLOOD PRESSURE: 91 MMHG | WEIGHT: 229.94 LBS | TEMPERATURE: 98 F | OXYGEN SATURATION: 97 % | HEIGHT: 68 IN | RESPIRATION RATE: 20 BRPM
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
## 2024-10-09 DIAGNOSIS — Z23 ENCOUNTER FOR IMMUNIZATION: ICD-10-CM

## 2024-10-09 DIAGNOSIS — S81.811A LACERATION WITHOUT FOREIGN BODY, RIGHT LOWER LEG, INITIAL ENCOUNTER: ICD-10-CM

## 2024-10-09 DIAGNOSIS — Z88.0 ALLERGY STATUS TO PENICILLIN: ICD-10-CM
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
## 2024-10-09 DIAGNOSIS — I89.0 LYMPHEDEMA, NOT ELSEWHERE CLASSIFIED: ICD-10-CM

## 2024-10-09 PROCEDURE — 12002 RPR S/N/AX/GEN/TRNK2.6-7.5CM: CPT

## 2024-10-09 PROCEDURE — 99284 EMERGENCY DEPT VISIT MOD MDM: CPT | Mod: FS,25

## 2024-10-09 PROCEDURE — 99283 EMERGENCY DEPT VISIT LOW MDM: CPT | Mod: 25

## 2024-10-09 PROCEDURE — 90471 IMMUNIZATION ADMIN: CPT

## 2024-10-09 PROCEDURE — 90715 TDAP VACCINE 7 YRS/> IM: CPT

## 2024-10-09 RX ORDER — TETANUS TOXOID, REDUCED DIPHTHERIA TOXOID AND ACELLULAR PERTUSSIS VACCINE, ADSORBED 5; 2.5; 8; 8; 2.5 [IU]/.5ML; [IU]/.5ML; UG/.5ML; UG/.5ML; UG/.5ML
0.5 SUSPENSION INTRAMUSCULAR ONCE
Refills: 0 | Status: COMPLETED | OUTPATIENT
Start: 2024-10-09 | End: 2024-10-09

## 2024-10-09 RX ADMIN — TETANUS TOXOID, REDUCED DIPHTHERIA TOXOID AND ACELLULAR PERTUSSIS VACCINE, ADSORBED 0.5 MILLILITER(S): 5; 2.5; 8; 8; 2.5 SUSPENSION INTRAMUSCULAR at 13:28

## 2024-10-09 NOTE — ED PROVIDER NOTE - PATIENT PORTAL LINK FT
You can access the FollowMyHealth Patient Portal offered by [facility] by registering at the following website: [email or web address]. By joining [employer]’s FollowMyHealth portal, you will also be able to view your health information using other applications (apps) compatible with our system.

## 2024-10-09 NOTE — ED PROVIDER NOTE - ATTENDING APP SHARED VISIT CONTRIBUTION OF CARE
83-year-old male past medical history noted presents for laceration to right shin after he bumped into something in the middle of the night.  Patient was recently on antibiotics for cellulitis.  On exam patient in NAD, AAOx3, pleasant, positive for centimeter laceration to right shin, positive bilateral lower extremity edema

## 2024-10-09 NOTE — ED PROVIDER NOTE - OBJECTIVE STATEMENT
Patient is an 83-year-old male history of chronic lymphedema here for evaluation of right shin laceration after bumping into something at 230 this morning.  Patient denies redness, purulent drainage

## 2024-10-09 NOTE — ED PROVIDER NOTE - PHYSICAL EXAMINATION
VITAL SIGNS: I have reviewed nursing notes and confirm.  CONSTITUTIONAL: Well-developed; well-nourished; in no acute distress.   SKIN: 4cm lac to right shin  HEAD: Normocephalic; atraumatic.  EYES: PERRL, EOM intact; conjunctiva and sclera clear.  ENT: No nasal discharge; airway clear.  EXT: Normal ROM.  No clubbing, cyanosis, chronic lymphedema    NEURO: Alert, oriented, grossly unremarkable

## 2024-10-09 NOTE — ED ADULT NURSE NOTE - NSFALLUNIVINTERV_ED_ALL_ED
Bed/Stretcher in lowest position, wheels locked, appropriate side rails in place/Call bell, personal items and telephone in reach/Instruct patient to call for assistance before getting out of bed/chair/stretcher/Non-slip footwear applied when patient is off stretcher/[place] to call system/Physically safe environment - no spills, clutter or unnecessary equipment/Purposeful proactive rounding/Room/bathroom lighting operational, light cord in reach

## 2024-10-09 NOTE — ED ADULT TRIAGE NOTE - CHIEF COMPLAINT QUOTE
pt has laceration to right shin  pt stated he fell on the steps, lost his balance, denies LOC, head strike or blood thinners   pt not up to date with tetanus

## 2024-10-09 NOTE — ED CLERICAL - NS ED CLERK NOTE PRE-ARRIVAL INFORMATION; ADDITIONAL PRE-ARRIVAL INFORMATION
This patient is enrolled in the STAR readmission reduction initiative and has active care navigation. This patient can be followed up by the care navigation team within 24 hours.  To arrange close follow-up or to obtain additional clinical information, please call the contact number above. The on-call [facility] Hospitalist has been notified and will coordinate care in concert with the ED Physician including consults as necessary. Please reach out to the Hospitalist on-call directly (schedule on AMiON posted on the intranet). You may also call the Hospitalist Division at [phone] at either site. Consider CDU for management per guideline”

## 2024-10-09 NOTE — ED PROVIDER NOTE - CLINICAL SUMMARY MEDICAL DECISION MAKING FREE TEXT BOX
Wound care and repair.  Will restart antibiotics.  Patient states that wound is high risk for area closely.

## 2024-10-10 ENCOUNTER — TRANSCRIPTION ENCOUNTER (OUTPATIENT)
Age: 84
End: 2024-10-10

## 2024-10-11 ENCOUNTER — TRANSCRIPTION ENCOUNTER (OUTPATIENT)
Age: 84
End: 2024-10-11

## 2024-10-15 ENCOUNTER — EMERGENCY (EMERGENCY)
Facility: HOSPITAL | Age: 84
LOS: 0 days | Discharge: HOME | End: 2024-10-15
Attending: STUDENT IN AN ORGANIZED HEALTH CARE EDUCATION/TRAINING PROGRAM
Payer: MEDICARE

## 2024-10-15 VITALS
TEMPERATURE: 98 F | OXYGEN SATURATION: 98 % | RESPIRATION RATE: 20 BRPM | HEART RATE: 91 BPM | HEIGHT: 68 IN | DIASTOLIC BLOOD PRESSURE: 77 MMHG | SYSTOLIC BLOOD PRESSURE: 119 MMHG

## 2024-10-15 DIAGNOSIS — Z88.0 ALLERGY STATUS TO PENICILLIN: ICD-10-CM

## 2024-10-15 DIAGNOSIS — I10 ESSENTIAL (PRIMARY) HYPERTENSION: ICD-10-CM

## 2024-10-15 DIAGNOSIS — S81.811D LACERATION WITHOUT FOREIGN BODY, RIGHT LOWER LEG, SUBSEQUENT ENCOUNTER: ICD-10-CM

## 2024-10-15 DIAGNOSIS — I89.0 LYMPHEDEMA, NOT ELSEWHERE CLASSIFIED: ICD-10-CM

## 2024-10-15 DIAGNOSIS — H10.029 OTHER MUCOPURULENT CONJUNCTIVITIS, UNSPECIFIED EYE: ICD-10-CM

## 2024-10-15 DIAGNOSIS — I25.10 ATHEROSCLEROTIC HEART DISEASE OF NATIVE CORONARY ARTERY WITHOUT ANGINA PECTORIS: ICD-10-CM

## 2024-10-15 DIAGNOSIS — E78.5 HYPERLIPIDEMIA, UNSPECIFIED: ICD-10-CM

## 2024-10-15 DIAGNOSIS — N40.0 BENIGN PROSTATIC HYPERPLASIA WITHOUT LOWER URINARY TRACT SYMPTOMS: ICD-10-CM

## 2024-10-15 PROCEDURE — L9995: CPT

## 2024-10-15 PROCEDURE — 99212 OFFICE O/P EST SF 10 MIN: CPT

## 2024-10-15 NOTE — ED PROVIDER NOTE - PATIENT PORTAL LINK FT
You can access the FollowMyHealth Patient Portal offered by [facility] by registering at the following website: [email or web address]. By joining [employer]’s FollowMyHealth portal, you will also be able to view your health information using other applications (apps) compatible with our system.

## 2024-10-15 NOTE — ED PROVIDER NOTE - ATTENDING APP SHARED VISIT CONTRIBUTION OF CARE
83-year-old male with a past medical history significant for lymphedema BPH CAD hypertension hyperlipidemia who presents with wound check.  Patient states that he sustained a laceration to his right lower extremity on October 9 and just came to have it checked.  Patient currently on antibiotics with no medical complaints.    VITAL SIGNS: I have reviewed nursing notes and confirm.  CONSTITUTIONAL: non-toxic, well appearing  SKIN: no rash, no petechiae.  EYES:  EOMI, pink conjunctiva, anicteric  ENT: tongue midline, no exudates, MMM  NECK: Supple; no meningismus, no JVD  EXT: Normal ROM x4. No edema. No calves tenderness. RLE: right lower extremity   positive laceration with sutures in place. wound appears with no redness or swelling . no signs of infection on examination dp +2. sensation intact.       83-year-old male that presents with wound check. no signs of infection, no redness, no fevers, neurovascularly intact. will dc pt with instructions to return for suture removal.

## 2024-10-15 NOTE — ED PROVIDER NOTE - OBJECTIVE STATEMENT
83-year-old male presents to the ED for evaluation.  Patient had sustained a laceration to his right lower extremity and had sutures placed.  Patient states he has a history of lymphedema and has noticed some fluid coming from the suture site.

## 2024-10-15 NOTE — ED ADULT NURSE NOTE - NSFALLRISKINTERV_ED_ALL_ED

## 2024-10-15 NOTE — ED CLERICAL - NS ED CLERK NOTE PRE-ARRIVAL INFORMATION; ADDITIONAL PRE-ARRIVAL INFORMATION
This patient is enrolled in the STAR readmission reduction initiative and has active care navigation. This patient can be followed up by the care navigation team within 24 hours.  To arrange close follow-up or to obtain additional clinical information, please call the contact number above. The on-call [facility] Hospitalist has been notified and will coordinate care in concert with the ED Physician including consults as necessary. Please reach out to the Hospitalist on-call directly (schedule on AMiON posted on the intranet). You may also call the Hospitalist Division at [phone] at either site. Consider CDU for management per guideline”

## 2024-10-15 NOTE — ED PROVIDER NOTE - PHYSICAL EXAMINATION
right lower extremity   positive laceration with sutures in place. wound appears with no reddness or swelling . no signs of infection on examination

## 2024-10-15 NOTE — ED PROVIDER NOTE - CLINICAL SUMMARY MEDICAL DECISION MAKING FREE TEXT BOX
83-year-old male that presents with wound check. no signs of infection, no redness, no fevers, neurovascularly intact. will dc pt with instructions to return for suture removal.  Patient's records (prior hospital, ED visit, and/or nursing home notes if available) were reviewed.  Additional history was obtained from EMS, family, and/or PCP (where available).  Escalation to admission/observation was considered.  However patient feels much better and is comfortable with discharge.  Appropriate follow-up was arranged.     I have discussed the discharge plan with the patient. The patient agrees with the plan, as discussed.  The patient understands Emergency Department diagnosis is a preliminary diagnosis often based on limited information and that the patient must adhere to the follow-up plan as discussed.  The patient understands that if the symptoms worsen or if prescribed medications do not have the desired/planned effect that the patient may return to the Emergency Department at any time for further evaluation and treatment.

## 2024-10-21 ENCOUNTER — EMERGENCY (EMERGENCY)
Facility: HOSPITAL | Age: 84
LOS: 0 days | Discharge: HOME | End: 2024-10-21
Attending: STUDENT IN AN ORGANIZED HEALTH CARE EDUCATION/TRAINING PROGRAM
Payer: MEDICARE

## 2024-10-21 VITALS
OXYGEN SATURATION: 99 % | RESPIRATION RATE: 18 BRPM | SYSTOLIC BLOOD PRESSURE: 139 MMHG | DIASTOLIC BLOOD PRESSURE: 55 MMHG | WEIGHT: 229.94 LBS | HEIGHT: 68 IN | HEART RATE: 66 BPM | TEMPERATURE: 98 F

## 2024-10-21 DIAGNOSIS — S81.811D LACERATION WITHOUT FOREIGN BODY, RIGHT LOWER LEG, SUBSEQUENT ENCOUNTER: ICD-10-CM

## 2024-10-21 PROCEDURE — L9995: CPT

## 2024-10-21 PROCEDURE — 99212 OFFICE O/P EST SF 10 MIN: CPT

## 2024-10-21 NOTE — ED ADULT NURSE NOTE - CAS EDN DISCHARGE ASSESSMENT
[facility]: Ms. Farooq Williamson was seen today for fetal growth assessment ultrasound. See ultrasound report under "OB Procedures" tab. The time spent on this established patient on the encounter date included 5 minutes previsit service time reviewing records and precharting, 10 minutes face-to-face service time counseling regarding results and coordinating care, and  5 minutes charting, totalling 20 minutes. Please don't hesitate to contact our office with any concerns or questions.   Mari Chanel MD Alert and oriented to person, place and time

## 2024-10-21 NOTE — ED CLERICAL - NS ED CLERK NOTE PRE-ARRIVAL INFORMATION; ADDITIONAL PRE-ARRIVAL INFORMATION
This patient is enrolled in the STAR readmission reduction initiative and has active care navigation. This patient can be followed up by the care navigation team within 24 hours.  To arrange close follow-up or to obtain additional clinical information, please call the contact number above. The on-call [facility] Hospitalist has been notified and will coordinate care in concert with the ED Physician including consults as necessary. Please reach out to the Hospitalist on-call directly (schedule on AMiON posted on the intranet). You may also call the Hospitalist Division at [phone] at either site. Consider CDU for management per guideline”

## 2024-10-21 NOTE — ED PROVIDER NOTE - PATIENT PORTAL LINK FT
You can access the FollowMyHealth Patient Portal offered by [facility] by registering at the following website: [email or web address]. By joining [employer]’s FollowMyHealth portal, you will also be able to view your health information using other applications (apps) compatible with our system.

## 2024-10-21 NOTE — ED PROVIDER NOTE - CLINICAL SUMMARY MEDICAL DECISION MAKING FREE TEXT BOX
83-year-old male that presents for suture removal sutures removed no signs of infection will DC patient with PCP follow-up and strict return precautions. Patient's records (prior hospital, ED visit, and/or nursing home notes if available) were reviewed.  Additional history was obtained from EMS, family, and/or PCP (where available).  Escalation to admission/observation was considered.  However patient feels much better and is comfortable with discharge.  Appropriate follow-up was arranged.     I have discussed the discharge plan with the patient. The patient agrees with the plan, as discussed.  The patient understands Emergency Department diagnosis is a preliminary diagnosis often based on limited information and that the patient must adhere to the follow-up plan as discussed.  The patient understands that if the symptoms worsen or if prescribed medications do not have the desired/planned effect that the patient may return to the Emergency Department at any time for further evaluation and treatment.

## 2024-10-21 NOTE — ED PROVIDER NOTE - NSFOLLOWUPINSTRUCTIONS_ED_ALL_ED_FT
Suture site cleaned, and sutures removed using suture removal kit.   No bleeding at wound, site examined wound approximated appropriately, no hematoma, no pus, no visible signs of infection.  Area covered with bacitracin and sterile dressing.

## 2024-10-21 NOTE — ED PROVIDER NOTE - PHYSICAL EXAMINATION
Right lower extremity.  Wound noted well-healed sutures in place no erythema surrounding the wound no purulent drainage noted.

## 2024-10-21 NOTE — ED ADULT NURSE NOTE - NSFALLUNIVINTERV_ED_ALL_ED
Bed/Stretcher in lowest position, wheels locked, appropriate side rails in place/Call bell, personal items and telephone in reach/Instruct patient to call for assistance before getting out of bed/chair/stretcher/Non-slip footwear applied when patient is off stretcher/[place] to call system/Physically safe environment - no spills, clutter or unnecessary equipment/Purposeful proactive rounding/Room/bathroom lighting operational, light cord in reach

## 2024-10-21 NOTE — ED PROVIDER NOTE - OBJECTIVE STATEMENT
82-year-old male presents to ED for suture removal right lower extremity.  Patient has no complaints.

## 2024-10-21 NOTE — ED ADULT TRIAGE NOTE - ARRIVAL FROM
Pt will need December script as well      Yamilex Swan called to request a refill on her medication. Last office visit : 9/23/2022   Next office visit : 11/10/2022     Last UDS: No results found for: Adalgisa Martini, BUPRENUR, COCAIMETSCRU, GABAPENTIN, MDMA, METAMPU, [address], OXTCOSU, [place], PROPOXYPHENE, THCSCREENUR, TRICYUR    Last Maynimesh Hernandez: under age   Medication Contract: need    Last Fill: 08/28/22 per michelle at [facility] drugs     Requested Prescriptions     Pending Prescriptions Disp Refills    Lisdexamfetamine Dimesylate (VYVANSE) 40 MG CAPS 30 capsule 0     Sig: TAKE ONE CAPSULE BY MOUTH EVERY DAY         Please approve or refuse this medication.    Billy Desai MA Home

## 2024-10-21 NOTE — ED PROVIDER NOTE - ATTENDING APP SHARED VISIT CONTRIBUTION OF CARE
83-year-old male with a past medical history for BPH CAD hyperlipidemia hypertension who presents for suture removal.  Patient was seen on October 9 for placements of sutures and the right lower extremity.  Patient denies any fever chills nausea vomiting discharge or any other medical complaints.      Right lower extremity.  Wound noted well-healed sutures in place no erythema surrounding the wound no purulent drainage noted.    83-year-old male that presents for suture removal sutures removed no signs of infection will DC patient with PCP follow-up and strict return precautions.

## 2025-05-15 ENCOUNTER — INPATIENT (INPATIENT)
Facility: HOSPITAL | Age: 85
LOS: 1 days | Discharge: HOME | DRG: 603 | End: 2025-05-17
Attending: INTERNAL MEDICINE | Admitting: FAMILY MEDICINE
Payer: MEDICARE

## 2025-05-15 VITALS
WEIGHT: 229.94 LBS | HEART RATE: 78 BPM | DIASTOLIC BLOOD PRESSURE: 90 MMHG | RESPIRATION RATE: 18 BRPM | SYSTOLIC BLOOD PRESSURE: 146 MMHG | TEMPERATURE: 98 F | HEIGHT: 68 IN | OXYGEN SATURATION: 95 %

## 2025-05-15 DIAGNOSIS — D84.89 OTHER IMMUNODEFICIENCIES: ICD-10-CM

## 2025-05-15 DIAGNOSIS — E78.5 HYPERLIPIDEMIA, UNSPECIFIED: ICD-10-CM

## 2025-05-15 DIAGNOSIS — N18.30 CHRONIC KIDNEY DISEASE, STAGE 3 UNSPECIFIED: ICD-10-CM

## 2025-05-15 DIAGNOSIS — I89.0 LYMPHEDEMA, NOT ELSEWHERE CLASSIFIED: ICD-10-CM

## 2025-05-15 DIAGNOSIS — L03.116 CELLULITIS OF LEFT LOWER LIMB: ICD-10-CM

## 2025-05-15 DIAGNOSIS — I87.8 OTHER SPECIFIED DISORDERS OF VEINS: ICD-10-CM

## 2025-05-15 DIAGNOSIS — G47.33 OBSTRUCTIVE SLEEP APNEA (ADULT) (PEDIATRIC): ICD-10-CM

## 2025-05-15 DIAGNOSIS — E87.1 HYPO-OSMOLALITY AND HYPONATREMIA: ICD-10-CM

## 2025-05-15 DIAGNOSIS — E66.9 OBESITY, UNSPECIFIED: ICD-10-CM

## 2025-05-15 DIAGNOSIS — N40.0 BENIGN PROSTATIC HYPERPLASIA WITHOUT LOWER URINARY TRACT SYMPTOMS: ICD-10-CM

## 2025-05-15 DIAGNOSIS — I12.9 HYPERTENSIVE CHRONIC KIDNEY DISEASE WITH STAGE 1 THROUGH STAGE 4 CHRONIC KIDNEY DISEASE, OR UNSPECIFIED CHRONIC KIDNEY DISEASE: ICD-10-CM

## 2025-05-15 DIAGNOSIS — Z88.0 ALLERGY STATUS TO PENICILLIN: ICD-10-CM

## 2025-05-15 DIAGNOSIS — I25.10 ATHEROSCLEROTIC HEART DISEASE OF NATIVE CORONARY ARTERY WITHOUT ANGINA PECTORIS: ICD-10-CM

## 2025-05-15 DIAGNOSIS — Z88.1 ALLERGY STATUS TO OTHER ANTIBIOTIC AGENTS: ICD-10-CM

## 2025-05-15 LAB
BASE EXCESS BLDV CALC-SCNC: 3.3 MMOL/L — HIGH (ref -2–3)
BASOPHILS # BLD AUTO: 0.05 K/UL — SIGNIFICANT CHANGE UP (ref 0–0.2)
BASOPHILS NFR BLD AUTO: 0.5 % — SIGNIFICANT CHANGE UP (ref 0–1)
CA-I SERPL-SCNC: 1.19 MMOL/L — SIGNIFICANT CHANGE UP (ref 1.15–1.33)
EOSINOPHIL # BLD AUTO: 0.1 K/UL — SIGNIFICANT CHANGE UP (ref 0–0.7)
EOSINOPHIL NFR BLD AUTO: 0.9 % — SIGNIFICANT CHANGE UP (ref 0–8)
GAS PNL BLDV: 133 MMOL/L — LOW (ref 136–145)
GAS PNL BLDV: SIGNIFICANT CHANGE UP
HCO3 BLDV-SCNC: 30 MMOL/L — HIGH (ref 22–29)
HCT VFR BLD CALC: 42.6 % — SIGNIFICANT CHANGE UP (ref 42–52)
HCT VFR BLDA CALC: 44 % — SIGNIFICANT CHANGE UP (ref 39–51)
HGB BLD CALC-MCNC: 14.8 G/DL — SIGNIFICANT CHANGE UP (ref 12.6–17.4)
HGB BLD-MCNC: 14.8 G/DL — SIGNIFICANT CHANGE UP (ref 14–18)
IMM GRANULOCYTES NFR BLD AUTO: 0.6 % — HIGH (ref 0.1–0.3)
INR BLD: 0.96 RATIO — SIGNIFICANT CHANGE UP (ref 0.65–1.3)
LACTATE BLDV-MCNC: 1.1 MMOL/L — SIGNIFICANT CHANGE UP (ref 0.5–2)
LYMPHOCYTES # BLD AUTO: 19.3 % — LOW (ref 20.5–51.1)
LYMPHOCYTES # BLD AUTO: 2.09 K/UL — SIGNIFICANT CHANGE UP (ref 1.2–3.4)
MCHC RBC-ENTMCNC: 31.1 PG — HIGH (ref 27–31)
MCHC RBC-ENTMCNC: 34.7 G/DL — SIGNIFICANT CHANGE UP (ref 32–37)
MCV RBC AUTO: 89.5 FL — SIGNIFICANT CHANGE UP (ref 80–94)
MONOCYTES # BLD AUTO: 0.75 K/UL — HIGH (ref 0.1–0.6)
MONOCYTES NFR BLD AUTO: 6.9 % — SIGNIFICANT CHANGE UP (ref 1.7–9.3)
NEUTROPHILS # BLD AUTO: 7.77 K/UL — HIGH (ref 1.4–6.5)
NEUTROPHILS NFR BLD AUTO: 71.8 % — SIGNIFICANT CHANGE UP (ref 42.2–75.2)
NRBC BLD AUTO-RTO: 0 /100 WBCS — SIGNIFICANT CHANGE UP (ref 0–0)
PCO2 BLDV: 50 MMHG — SIGNIFICANT CHANGE UP (ref 42–55)
PH BLDV: 7.38 — SIGNIFICANT CHANGE UP (ref 7.32–7.43)
PLATELET # BLD AUTO: 168 K/UL — SIGNIFICANT CHANGE UP (ref 130–400)
PMV BLD: 10.1 FL — SIGNIFICANT CHANGE UP (ref 7.4–10.4)
PO2 BLDV: 25 MMHG — SIGNIFICANT CHANGE UP (ref 25–45)
POTASSIUM BLDV-SCNC: 3.7 MMOL/L — SIGNIFICANT CHANGE UP (ref 3.5–5.1)
PROTHROM AB SERPL-ACNC: 11.3 SEC — SIGNIFICANT CHANGE UP (ref 9.95–12.87)
RBC # BLD: 4.76 M/UL — SIGNIFICANT CHANGE UP (ref 4.7–6.1)
RBC # FLD: 12.5 % — SIGNIFICANT CHANGE UP (ref 11.5–14.5)
SAO2 % BLDV: 39.1 % — LOW (ref 67–88)
WBC # BLD: 10.82 K/UL — HIGH (ref 4.8–10.8)
WBC # FLD AUTO: 10.82 K/UL — HIGH (ref 4.8–10.8)

## 2025-05-15 PROCEDURE — 99285 EMERGENCY DEPT VISIT HI MDM: CPT | Mod: GC

## 2025-05-15 PROCEDURE — 93010 ELECTROCARDIOGRAM REPORT: CPT

## 2025-05-15 PROCEDURE — 93970 EXTREMITY STUDY: CPT | Mod: 26

## 2025-05-15 PROCEDURE — 73590 X-RAY EXAM OF LOWER LEG: CPT | Mod: 26,LT

## 2025-05-15 RX ORDER — CEFEPIME 2 G/20ML
2000 INJECTION, POWDER, FOR SOLUTION INTRAVENOUS ONCE
Refills: 0 | Status: COMPLETED | OUTPATIENT
Start: 2025-05-15 | End: 2025-05-15

## 2025-05-16 DIAGNOSIS — L03.116 CELLULITIS OF LEFT LOWER LIMB: ICD-10-CM

## 2025-05-16 LAB
A1C WITH ESTIMATED AVERAGE GLUCOSE RESULT: 6.5 % — HIGH (ref 4–5.6)
ALBUMIN SERPL ELPH-MCNC: 3.9 G/DL — SIGNIFICANT CHANGE UP (ref 3.5–5.2)
ALP SERPL-CCNC: 77 U/L — SIGNIFICANT CHANGE UP (ref 30–115)
ALT FLD-CCNC: 20 U/L — SIGNIFICANT CHANGE UP (ref 0–41)
ANION GAP SERPL CALC-SCNC: 12 MMOL/L — SIGNIFICANT CHANGE UP (ref 7–14)
ANION GAP SERPL CALC-SCNC: 14 MMOL/L — SIGNIFICANT CHANGE UP (ref 7–14)
APPEARANCE UR: CLEAR — SIGNIFICANT CHANGE UP
APTT BLD: 29.2 SEC — SIGNIFICANT CHANGE UP (ref 27–39.2)
AST SERPL-CCNC: 26 U/L — SIGNIFICANT CHANGE UP (ref 0–41)
BACTERIA # UR AUTO: ABNORMAL /HPF
BILIRUB SERPL-MCNC: 0.7 MG/DL — SIGNIFICANT CHANGE UP (ref 0.2–1.2)
BILIRUB UR-MCNC: NEGATIVE — SIGNIFICANT CHANGE UP
BUN SERPL-MCNC: 29 MG/DL — HIGH (ref 10–20)
BUN SERPL-MCNC: 33 MG/DL — HIGH (ref 10–20)
CALCIUM SERPL-MCNC: 9.1 MG/DL — SIGNIFICANT CHANGE UP (ref 8.4–10.5)
CALCIUM SERPL-MCNC: 9.3 MG/DL — SIGNIFICANT CHANGE UP (ref 8.4–10.5)
CHLORIDE SERPL-SCNC: 100 MMOL/L — SIGNIFICANT CHANGE UP (ref 98–110)
CHLORIDE SERPL-SCNC: 96 MMOL/L — LOW (ref 98–110)
CHOLEST SERPL-MCNC: 125 MG/DL — SIGNIFICANT CHANGE UP
CO2 SERPL-SCNC: 24 MMOL/L — SIGNIFICANT CHANGE UP (ref 17–32)
CO2 SERPL-SCNC: 25 MMOL/L — SIGNIFICANT CHANGE UP (ref 17–32)
COLOR SPEC: YELLOW — SIGNIFICANT CHANGE UP
CREAT SERPL-MCNC: 1.5 MG/DL — SIGNIFICANT CHANGE UP (ref 0.7–1.5)
CREAT SERPL-MCNC: 1.5 MG/DL — SIGNIFICANT CHANGE UP (ref 0.7–1.5)
CRP SERPL-MCNC: 106.9 MG/L — HIGH
DIFF PNL FLD: NEGATIVE — SIGNIFICANT CHANGE UP
EGFR: 46 ML/MIN/1.73M2 — LOW
EPI CELLS # UR: SIGNIFICANT CHANGE UP
ERYTHROCYTE [SEDIMENTATION RATE] IN BLOOD: 30 MM/HR — HIGH (ref 0–15)
ESTIMATED AVERAGE GLUCOSE: 140 MG/DL — HIGH (ref 68–114)
GLUCOSE SERPL-MCNC: 133 MG/DL — HIGH (ref 70–99)
GLUCOSE SERPL-MCNC: 134 MG/DL — HIGH (ref 70–99)
GLUCOSE UR QL: NEGATIVE MG/DL — SIGNIFICANT CHANGE UP
HCT VFR BLD CALC: 42.5 % — SIGNIFICANT CHANGE UP (ref 42–52)
HDLC SERPL-MCNC: 38 MG/DL — LOW
HGB BLD-MCNC: 14.5 G/DL — SIGNIFICANT CHANGE UP (ref 14–18)
KETONES UR QL: NEGATIVE MG/DL — SIGNIFICANT CHANGE UP
LACTATE SERPL-SCNC: 1.2 MMOL/L — SIGNIFICANT CHANGE UP (ref 0.7–2)
LDLC SERPL-MCNC: 64 MG/DL — SIGNIFICANT CHANGE UP
LEUKOCYTE ESTERASE UR-ACNC: ABNORMAL
LIPID PNL WITH DIRECT LDL SERPL: 64 MG/DL — SIGNIFICANT CHANGE UP
MCHC RBC-ENTMCNC: 30.7 PG — SIGNIFICANT CHANGE UP (ref 27–31)
MCHC RBC-ENTMCNC: 34.1 G/DL — SIGNIFICANT CHANGE UP (ref 32–37)
MCV RBC AUTO: 89.9 FL — SIGNIFICANT CHANGE UP (ref 80–94)
NITRITE UR-MCNC: NEGATIVE — SIGNIFICANT CHANGE UP
NONHDLC SERPL-MCNC: 87 MG/DL — SIGNIFICANT CHANGE UP
NRBC BLD AUTO-RTO: 0 /100 WBCS — SIGNIFICANT CHANGE UP (ref 0–0)
NT-PROBNP SERPL-SCNC: 183 PG/ML — SIGNIFICANT CHANGE UP (ref 0–300)
PH UR: 6 — SIGNIFICANT CHANGE UP (ref 5–8)
PLATELET # BLD AUTO: 165 K/UL — SIGNIFICANT CHANGE UP (ref 130–400)
PMV BLD: 10.1 FL — SIGNIFICANT CHANGE UP (ref 7.4–10.4)
POTASSIUM SERPL-MCNC: 3.7 MMOL/L — SIGNIFICANT CHANGE UP (ref 3.5–5)
POTASSIUM SERPL-MCNC: 3.8 MMOL/L — SIGNIFICANT CHANGE UP (ref 3.5–5)
POTASSIUM SERPL-SCNC: 3.7 MMOL/L — SIGNIFICANT CHANGE UP (ref 3.5–5)
POTASSIUM SERPL-SCNC: 3.8 MMOL/L — SIGNIFICANT CHANGE UP (ref 3.5–5)
PROCALCITONIN SERPL-MCNC: 2.5 NG/ML — HIGH (ref 0.02–0.1)
PROT SERPL-MCNC: 6.9 G/DL — SIGNIFICANT CHANGE UP (ref 6–8)
PROT UR-MCNC: NEGATIVE MG/DL — SIGNIFICANT CHANGE UP
RBC # BLD: 4.73 M/UL — SIGNIFICANT CHANGE UP (ref 4.7–6.1)
RBC # FLD: 12.4 % — SIGNIFICANT CHANGE UP (ref 11.5–14.5)
RBC CASTS # UR COMP ASSIST: 4 /HPF — SIGNIFICANT CHANGE UP (ref 0–4)
SODIUM SERPL-SCNC: 134 MMOL/L — LOW (ref 135–146)
SODIUM SERPL-SCNC: 137 MMOL/L — SIGNIFICANT CHANGE UP (ref 135–146)
SP GR SPEC: 1.01 — SIGNIFICANT CHANGE UP (ref 1–1.03)
TRIGL SERPL-MCNC: 130 MG/DL — SIGNIFICANT CHANGE UP
UROBILINOGEN FLD QL: 0.2 MG/DL — SIGNIFICANT CHANGE UP (ref 0.2–1)
WBC # BLD: 7.83 K/UL — SIGNIFICANT CHANGE UP (ref 4.8–10.8)
WBC # FLD AUTO: 7.83 K/UL — SIGNIFICANT CHANGE UP (ref 4.8–10.8)
WBC UR QL: 3 /HPF — SIGNIFICANT CHANGE UP (ref 0–5)

## 2025-05-16 PROCEDURE — 36415 COLL VENOUS BLD VENIPUNCTURE: CPT

## 2025-05-16 PROCEDURE — 85025 COMPLETE CBC W/AUTO DIFF WBC: CPT

## 2025-05-16 PROCEDURE — 80048 BASIC METABOLIC PNL TOTAL CA: CPT

## 2025-05-16 PROCEDURE — 73701 CT LOWER EXTREMITY W/DYE: CPT | Mod: LT

## 2025-05-16 PROCEDURE — 84145 PROCALCITONIN (PCT): CPT

## 2025-05-16 PROCEDURE — 80061 LIPID PANEL: CPT

## 2025-05-16 PROCEDURE — 83036 HEMOGLOBIN GLYCOSYLATED A1C: CPT

## 2025-05-16 PROCEDURE — 73701 CT LOWER EXTREMITY W/DYE: CPT | Mod: 26,LT

## 2025-05-16 PROCEDURE — 99222 1ST HOSP IP/OBS MODERATE 55: CPT | Mod: FS

## 2025-05-16 PROCEDURE — 85027 COMPLETE CBC AUTOMATED: CPT

## 2025-05-16 PROCEDURE — 85652 RBC SED RATE AUTOMATED: CPT

## 2025-05-16 PROCEDURE — 86140 C-REACTIVE PROTEIN: CPT

## 2025-05-16 PROCEDURE — 81001 URINALYSIS AUTO W/SCOPE: CPT

## 2025-05-16 PROCEDURE — 83880 ASSAY OF NATRIURETIC PEPTIDE: CPT

## 2025-05-16 PROCEDURE — 80053 COMPREHEN METABOLIC PANEL: CPT

## 2025-05-16 PROCEDURE — 99222 1ST HOSP IP/OBS MODERATE 55: CPT

## 2025-05-16 RX ORDER — TAMSULOSIN HYDROCHLORIDE 0.4 MG/1
0.4 CAPSULE ORAL AT BEDTIME
Refills: 0 | Status: DISCONTINUED | OUTPATIENT
Start: 2025-05-16 | End: 2025-05-17

## 2025-05-16 RX ORDER — ATORVASTATIN CALCIUM 80 MG/1
1 TABLET, FILM COATED ORAL
Refills: 0 | DISCHARGE

## 2025-05-16 RX ORDER — SPIRONOLACTONE 25 MG
25 TABLET ORAL DAILY
Refills: 0 | Status: DISCONTINUED | OUTPATIENT
Start: 2025-05-16 | End: 2025-05-17

## 2025-05-16 RX ORDER — TIMOLOL MALEATE 10 MG
1 TABLET ORAL
Refills: 0 | DISCHARGE

## 2025-05-16 RX ORDER — LINEZOLID 2 MG/ML
600 INJECTION, SOLUTION INTRAVENOUS EVERY 12 HOURS
Refills: 0 | Status: DISCONTINUED | OUTPATIENT
Start: 2025-05-16 | End: 2025-05-17

## 2025-05-16 RX ORDER — GABAPENTIN 400 MG/1
1200 CAPSULE ORAL DAILY
Refills: 0 | Status: DISCONTINUED | OUTPATIENT
Start: 2025-05-16 | End: 2025-05-17

## 2025-05-16 RX ORDER — LISINOPRIL 30 MG/1
1 TABLET ORAL
Refills: 0 | DISCHARGE

## 2025-05-16 RX ORDER — FUROSEMIDE 10 MG/ML
40 INJECTION INTRAMUSCULAR; INTRAVENOUS DAILY
Refills: 0 | Status: DISCONTINUED | OUTPATIENT
Start: 2025-05-16 | End: 2025-05-17

## 2025-05-16 RX ORDER — VANCOMYCIN HCL IN 5 % DEXTROSE 1.5G/250ML
1000 PLASTIC BAG, INJECTION (ML) INTRAVENOUS EVERY 24 HOURS
Refills: 0 | Status: DISCONTINUED | OUTPATIENT
Start: 2025-05-16 | End: 2025-05-16

## 2025-05-16 RX ORDER — LOSARTAN POTASSIUM 100 MG/1
25 TABLET, FILM COATED ORAL DAILY
Refills: 0 | Status: DISCONTINUED | OUTPATIENT
Start: 2025-05-16 | End: 2025-05-17

## 2025-05-16 RX ORDER — CLINDAMYCIN PHOSPHATE 150 MG/ML
600 VIAL (ML) INJECTION ONCE
Refills: 0 | Status: COMPLETED | OUTPATIENT
Start: 2025-05-16 | End: 2025-05-16

## 2025-05-16 RX ORDER — ACETAMINOPHEN 500 MG/5ML
650 LIQUID (ML) ORAL EVERY 6 HOURS
Refills: 0 | Status: DISCONTINUED | OUTPATIENT
Start: 2025-05-16 | End: 2025-05-17

## 2025-05-16 RX ORDER — MELATONIN 5 MG
3 TABLET ORAL AT BEDTIME
Refills: 0 | Status: DISCONTINUED | OUTPATIENT
Start: 2025-05-16 | End: 2025-05-17

## 2025-05-16 RX ORDER — DIPHENHYDRAMINE HCL 12.5MG/5ML
25 ELIXIR ORAL ONCE
Refills: 0 | Status: COMPLETED | OUTPATIENT
Start: 2025-05-16 | End: 2025-05-16

## 2025-05-16 RX ORDER — HEPARIN SODIUM 1000 [USP'U]/ML
5000 INJECTION INTRAVENOUS; SUBCUTANEOUS EVERY 8 HOURS
Refills: 0 | Status: DISCONTINUED | OUTPATIENT
Start: 2025-05-16 | End: 2025-05-17

## 2025-05-16 RX ORDER — ONDANSETRON HCL/PF 4 MG/2 ML
4 VIAL (ML) INJECTION EVERY 8 HOURS
Refills: 0 | Status: DISCONTINUED | OUTPATIENT
Start: 2025-05-16 | End: 2025-05-17

## 2025-05-16 RX ORDER — METHYLPREDNISOLONE ACETATE 80 MG/ML
125 INJECTION, SUSPENSION INTRA-ARTICULAR; INTRALESIONAL; INTRAMUSCULAR; SOFT TISSUE ONCE
Refills: 0 | Status: COMPLETED | OUTPATIENT
Start: 2025-05-16 | End: 2025-05-16

## 2025-05-16 RX ORDER — MAGNESIUM, ALUMINUM HYDROXIDE 200-200 MG
30 TABLET,CHEWABLE ORAL EVERY 4 HOURS
Refills: 0 | Status: DISCONTINUED | OUTPATIENT
Start: 2025-05-16 | End: 2025-05-17

## 2025-05-16 RX ADMIN — METHYLPREDNISOLONE ACETATE 125 MILLIGRAM(S): 80 INJECTION, SUSPENSION INTRA-ARTICULAR; INTRALESIONAL; INTRAMUSCULAR; SOFT TISSUE at 00:35

## 2025-05-16 RX ADMIN — LOSARTAN POTASSIUM 25 MILLIGRAM(S): 100 TABLET, FILM COATED ORAL at 07:48

## 2025-05-16 RX ADMIN — Medication 250 MILLILITER(S): at 00:50

## 2025-05-16 RX ADMIN — FUROSEMIDE 40 MILLIGRAM(S): 10 INJECTION INTRAMUSCULAR; INTRAVENOUS at 06:14

## 2025-05-16 RX ADMIN — GABAPENTIN 1200 MILLIGRAM(S): 400 CAPSULE ORAL at 12:44

## 2025-05-16 RX ADMIN — Medication 250 MILLIGRAM(S): at 10:23

## 2025-05-16 RX ADMIN — CEFEPIME 100 MILLIGRAM(S): 2 INJECTION, POWDER, FOR SOLUTION INTRAVENOUS at 00:08

## 2025-05-16 RX ADMIN — Medication 25 MILLIGRAM(S): at 00:22

## 2025-05-16 RX ADMIN — Medication 250 MILLILITER(S): at 00:08

## 2025-05-16 RX ADMIN — CEFEPIME 2000 MILLIGRAM(S): 2 INJECTION, POWDER, FOR SOLUTION INTRAVENOUS at 00:12

## 2025-05-16 RX ADMIN — LINEZOLID 600 MILLIGRAM(S): 2 INJECTION, SOLUTION INTRAVENOUS at 17:50

## 2025-05-16 RX ADMIN — Medication 100 MILLIGRAM(S): at 00:50

## 2025-05-16 RX ADMIN — HEPARIN SODIUM 5000 UNIT(S): 1000 INJECTION INTRAVENOUS; SUBCUTANEOUS at 14:30

## 2025-05-16 RX ADMIN — Medication 25 MILLIGRAM(S): at 06:15

## 2025-05-16 RX ADMIN — HEPARIN SODIUM 5000 UNIT(S): 1000 INJECTION INTRAVENOUS; SUBCUTANEOUS at 06:15

## 2025-05-16 RX ADMIN — TAMSULOSIN HYDROCHLORIDE 0.4 MILLIGRAM(S): 0.4 CAPSULE ORAL at 21:29

## 2025-05-16 RX ADMIN — Medication 600 MILLIGRAM(S): at 01:50

## 2025-05-16 RX ADMIN — HEPARIN SODIUM 5000 UNIT(S): 1000 INJECTION INTRAVENOUS; SUBCUTANEOUS at 21:31

## 2025-05-17 ENCOUNTER — TRANSCRIPTION ENCOUNTER (OUTPATIENT)
Age: 85
End: 2025-05-17

## 2025-05-17 VITALS
OXYGEN SATURATION: 95 % | HEART RATE: 84 BPM | DIASTOLIC BLOOD PRESSURE: 74 MMHG | TEMPERATURE: 98 F | SYSTOLIC BLOOD PRESSURE: 116 MMHG | RESPIRATION RATE: 20 BRPM

## 2025-05-17 LAB
ALBUMIN SERPL ELPH-MCNC: 3.3 G/DL — LOW (ref 3.5–5.2)
ALP SERPL-CCNC: 79 U/L — SIGNIFICANT CHANGE UP (ref 30–115)
ALT FLD-CCNC: 22 U/L — SIGNIFICANT CHANGE UP (ref 0–41)
ANION GAP SERPL CALC-SCNC: 14 MMOL/L — SIGNIFICANT CHANGE UP (ref 7–14)
AST SERPL-CCNC: 23 U/L — SIGNIFICANT CHANGE UP (ref 0–41)
BASOPHILS # BLD AUTO: 0.05 K/UL — SIGNIFICANT CHANGE UP (ref 0–0.2)
BASOPHILS NFR BLD AUTO: 0.8 % — SIGNIFICANT CHANGE UP (ref 0–1)
BILIRUB SERPL-MCNC: 0.6 MG/DL — SIGNIFICANT CHANGE UP (ref 0.2–1.2)
BUN SERPL-MCNC: 24 MG/DL — HIGH (ref 10–20)
CALCIUM SERPL-MCNC: 9 MG/DL — SIGNIFICANT CHANGE UP (ref 8.4–10.5)
CHLORIDE SERPL-SCNC: 99 MMOL/L — SIGNIFICANT CHANGE UP (ref 98–110)
CO2 SERPL-SCNC: 27 MMOL/L — SIGNIFICANT CHANGE UP (ref 17–32)
CREAT SERPL-MCNC: 1.4 MG/DL — SIGNIFICANT CHANGE UP (ref 0.7–1.5)
EGFR: 50 ML/MIN/1.73M2 — LOW
EGFR: 50 ML/MIN/1.73M2 — LOW
EOSINOPHIL # BLD AUTO: 0.12 K/UL — SIGNIFICANT CHANGE UP (ref 0–0.7)
EOSINOPHIL NFR BLD AUTO: 1.9 % — SIGNIFICANT CHANGE UP (ref 0–8)
GLUCOSE SERPL-MCNC: 168 MG/DL — HIGH (ref 70–99)
HCT VFR BLD CALC: 44 % — SIGNIFICANT CHANGE UP (ref 42–52)
HGB BLD-MCNC: 14.9 G/DL — SIGNIFICANT CHANGE UP (ref 14–18)
IMM GRANULOCYTES NFR BLD AUTO: 0.9 % — HIGH (ref 0.1–0.3)
LYMPHOCYTES # BLD AUTO: 1.43 K/UL — SIGNIFICANT CHANGE UP (ref 1.2–3.4)
LYMPHOCYTES # BLD AUTO: 22.3 % — SIGNIFICANT CHANGE UP (ref 20.5–51.1)
MCHC RBC-ENTMCNC: 30.5 PG — SIGNIFICANT CHANGE UP (ref 27–31)
MCHC RBC-ENTMCNC: 33.9 G/DL — SIGNIFICANT CHANGE UP (ref 32–37)
MCV RBC AUTO: 90 FL — SIGNIFICANT CHANGE UP (ref 80–94)
MONOCYTES # BLD AUTO: 0.44 K/UL — SIGNIFICANT CHANGE UP (ref 0.1–0.6)
MONOCYTES NFR BLD AUTO: 6.9 % — SIGNIFICANT CHANGE UP (ref 1.7–9.3)
NEUTROPHILS # BLD AUTO: 4.3 K/UL — SIGNIFICANT CHANGE UP (ref 1.4–6.5)
NEUTROPHILS NFR BLD AUTO: 67.2 % — SIGNIFICANT CHANGE UP (ref 42.2–75.2)
NRBC BLD AUTO-RTO: 0 /100 WBCS — SIGNIFICANT CHANGE UP (ref 0–0)
PLATELET # BLD AUTO: 178 K/UL — SIGNIFICANT CHANGE UP (ref 130–400)
PMV BLD: 10.1 FL — SIGNIFICANT CHANGE UP (ref 7.4–10.4)
POTASSIUM SERPL-MCNC: 4 MMOL/L — SIGNIFICANT CHANGE UP (ref 3.5–5)
POTASSIUM SERPL-SCNC: 4 MMOL/L — SIGNIFICANT CHANGE UP (ref 3.5–5)
PROT SERPL-MCNC: 6 G/DL — SIGNIFICANT CHANGE UP (ref 6–8)
RBC # BLD: 4.89 M/UL — SIGNIFICANT CHANGE UP (ref 4.7–6.1)
RBC # FLD: 12.4 % — SIGNIFICANT CHANGE UP (ref 11.5–14.5)
SODIUM SERPL-SCNC: 140 MMOL/L — SIGNIFICANT CHANGE UP (ref 135–146)
WBC # BLD: 6.4 K/UL — SIGNIFICANT CHANGE UP (ref 4.8–10.8)
WBC # FLD AUTO: 6.4 K/UL — SIGNIFICANT CHANGE UP (ref 4.8–10.8)

## 2025-05-17 PROCEDURE — 99239 HOSP IP/OBS DSCHRG MGMT >30: CPT

## 2025-05-17 RX ORDER — LINEZOLID 2 MG/ML
1 INJECTION, SOLUTION INTRAVENOUS
Qty: 12 | Refills: 0
Start: 2025-05-17 | End: 2025-05-22

## 2025-05-17 RX ADMIN — HEPARIN SODIUM 5000 UNIT(S): 1000 INJECTION INTRAVENOUS; SUBCUTANEOUS at 05:29

## 2025-05-17 RX ADMIN — Medication 1 APPLICATION(S): at 05:29

## 2025-05-17 RX ADMIN — LOSARTAN POTASSIUM 25 MILLIGRAM(S): 100 TABLET, FILM COATED ORAL at 05:29

## 2025-05-17 RX ADMIN — Medication 25 MILLIGRAM(S): at 05:29

## 2025-05-17 RX ADMIN — LINEZOLID 600 MILLIGRAM(S): 2 INJECTION, SOLUTION INTRAVENOUS at 05:29

## 2025-05-17 RX ADMIN — FUROSEMIDE 40 MILLIGRAM(S): 10 INJECTION INTRAMUSCULAR; INTRAVENOUS at 05:29

## 2025-05-17 RX ADMIN — GABAPENTIN 1200 MILLIGRAM(S): 400 CAPSULE ORAL at 13:47

## 2025-06-26 ENCOUNTER — EMERGENCY (EMERGENCY)
Facility: HOSPITAL | Age: 85
LOS: 0 days | Discharge: HOME | End: 2025-06-26
Attending: EMERGENCY MEDICINE
Payer: MEDICARE

## 2025-06-26 VITALS
SYSTOLIC BLOOD PRESSURE: 128 MMHG | OXYGEN SATURATION: 97 % | HEIGHT: 68 IN | DIASTOLIC BLOOD PRESSURE: 76 MMHG | RESPIRATION RATE: 18 BRPM | WEIGHT: 220.02 LBS | TEMPERATURE: 98 F | HEART RATE: 84 BPM

## 2025-06-26 DIAGNOSIS — E78.5 HYPERLIPIDEMIA, UNSPECIFIED: ICD-10-CM
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
## 2025-06-26 DIAGNOSIS — S80.812A ABRASION, LEFT LOWER LEG, INITIAL ENCOUNTER: ICD-10-CM

## 2025-06-26 DIAGNOSIS — Z88.0 ALLERGY STATUS TO PENICILLIN: ICD-10-CM
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
## 2025-06-26 DIAGNOSIS — R60.0 LOCALIZED EDEMA: ICD-10-CM

## 2025-06-26 DIAGNOSIS — I89.0 LYMPHEDEMA, NOT ELSEWHERE CLASSIFIED: ICD-10-CM

## 2025-06-26 PROCEDURE — 99283 EMERGENCY DEPT VISIT LOW MDM: CPT

## 2025-06-26 PROCEDURE — 99284 EMERGENCY DEPT VISIT MOD MDM: CPT | Mod: FS

## 2025-06-26 RX ORDER — CEPHALEXIN 250 MG/1
1 CAPSULE ORAL
Qty: 28 | Refills: 0
Start: 2025-06-26 | End: 2025-07-02

## 2025-06-26 NOTE — ED ADULT NURSE NOTE - NSFALLUNIVINTERV_ED_ALL_ED
Bed/Stretcher in lowest position, wheels locked, appropriate side rails in place/Call bell, personal items and telephone in reach/Instruct patient to call for assistance before getting out of bed/chair/stretcher/Non-slip footwear applied when patient is off stretcher/[place] to call system/Physically safe environment - no spills, clutter or unnecessary equipment/Purposeful proactive rounding/Room/bathroom lighting operational, light cord in reach
No

## 2025-06-26 NOTE — ED PROVIDER NOTE - ATTENDING APP SHARED VISIT CONTRIBUTION OF CARE
Patient with chronic lymphedema requesting antibiotic as he has an area of weeping which is previously become cellulitic leading to sepsis, no fever, no chest pain or shortness of breath, on exam is lymphedema with chronic venous stasis changes and small area of weeping/skin break, duplex negative, will discharge on antibiotics to wear his compression stockings and follow-up with his doctor, counseled regarding conditions which should prompt return.

## 2025-06-26 NOTE — ED PROVIDER NOTE - NSFOLLOWUPINSTRUCTIONS_ED_ALL_ED_FT
Follow up with your primary care doctor in 1-2 days         Edema    WHAT YOU NEED TO KNOW:    Edema is swelling throughout your body. Edema is usually a sign that you are retaining fluid. The swelling may be caused by heart failure or kidney, thyroid, or liver disease. It may also be caused by medicines such as antidepressants, blood pressure medicines, or hormones. Sudden swelling around the lips or face may be a sign of a severe allergic reaction. Swelling of an arm or leg may be caused by blockage of your veins.     DISCHARGE INSTRUCTIONS:    Return to the emergency department if:   •You have shortness of breath at rest, especially when you lie down.      •You cough up pink, foamy sputum.       •You have chest pain.      •Your heartbeat is fast or uneven.       Contact your healthcare provider if:   •The swollen area feels cold and is pale or blue in color.      •The swollen area feels warm, painful, and is red in color.      •You have increased swelling or swelling in other parts of your body.      •You have questions or concerns about your condition or care.      Medicines:   •Medicines help to get rid of extra body fluid.       •Take your medicine as directed. Contact your healthcare provider if you think your medicine is not helping or if you have side effects. Tell him or her if you are allergic to any medicine. Keep a list of the medicines, vitamins, and herbs you take. Include the amounts, and when and why you take them. Bring the list or the pill bottles to follow-up visits. Carry your medicine list with you in case of an emergency.      Follow up with your healthcare provider as directed: Write down your questions so you remember to ask them during your visits.     Manage edema:   •Elevate your arms or legs as directed. Raise them above the level of your heart as often as you can. This will help decrease swelling and pain. Prop them on pillows or blankets to keep them elevated comfortably.      •Wear pressure stockings as directed. The stockings are tight and put pressure on your legs. This helps to keep fluid from collecting in your legs or ankles.       •Limit your salt intake. Salt causes your body to hold water. Ask about any other changes to your diet.      •Stay active. Do not stand or sit for long periods of time. Ask your healthcare provider about the best exercise plan for you.      •Keep your skin moist using lotion, cream, or ointment. Ask your healthcare provider what to use and how often to use it.         © Copyright [employer] 2020

## 2025-06-26 NOTE — ED PROVIDER NOTE - PHYSICAL EXAMINATION
Physical Exam    Vital Signs: I have reviewed the initial vital signs.  Constitutional: well-nourished, appears stated age, no acute distress  Eyes: Conjunctiva pink, Sclera clear, PERRLA, EOMI.  Cardiovascular: S1 and S2, regular rate, regular rhythm, well-perfused extremities, radial pulses equal and 2+  Respiratory: unlabored respiratory effort, clear to auscultation bilaterally no wheezing, rales and rhonchi  Gastrointestinal: soft, non-tender abdomen, no pulsatile mass, normal bowl sounds  Musculoskeletal: supple neck, + b/l lower extremity edema, no midline tenderness  Integumentary: warm, dry, + small abrasion to left lower leg noted that is not wheeping currently. no redness no tenderness  Neurologic: awake, alert, cranial nerves II-XII grossly intact, extremities’ motor and sensory functions grossly intact  Psychiatric: appropriate mood, appropriate affect

## 2025-06-26 NOTE — ED PROVIDER NOTE - PATIENT PORTAL LINK FT
You can access the FollowMyHealth Patient Portal offered by [facility] by registering at the following website: [email or web address]. By joining [employer]’s FollowMyHealth portal, you will also be able to view your health information using other applications (apps) compatible with our system.

## 2025-06-26 NOTE — ED PROVIDER NOTE - OBJECTIVE STATEMENT
84 year old male past medical history of Coronary Artery Disease, HLD, Lymphedema, sepsis in the past comes to emergency room for wheeping area on right lower leg. patient states that he accident nicked a few days ago and has been leaking since. patient states that the last time this happened he had sepsis and came to emergency room for abx. no fever/chills.

## 2025-07-02 ENCOUNTER — EMERGENCY (EMERGENCY)
Facility: HOSPITAL | Age: 85
LOS: 0 days | Discharge: HOME | End: 2025-07-02
Attending: STUDENT IN AN ORGANIZED HEALTH CARE EDUCATION/TRAINING PROGRAM
Payer: MEDICARE

## 2025-07-02 VITALS
TEMPERATURE: 98 F | HEIGHT: 68 IN | SYSTOLIC BLOOD PRESSURE: 145 MMHG | HEART RATE: 86 BPM | WEIGHT: 225.09 LBS | DIASTOLIC BLOOD PRESSURE: 75 MMHG | OXYGEN SATURATION: 98 % | RESPIRATION RATE: 18 BRPM

## 2025-07-02 DIAGNOSIS — I87.8 OTHER SPECIFIED DISORDERS OF VEINS: ICD-10-CM

## 2025-07-02 DIAGNOSIS — Z88.0 ALLERGY STATUS TO PENICILLIN: ICD-10-CM

## 2025-07-02 DIAGNOSIS — R60.9 EDEMA, UNSPECIFIED: ICD-10-CM

## 2025-07-02 DIAGNOSIS — I25.10 ATHEROSCLEROTIC HEART DISEASE OF NATIVE CORONARY ARTERY WITHOUT ANGINA PECTORIS: ICD-10-CM

## 2025-07-02 DIAGNOSIS — E78.5 HYPERLIPIDEMIA, UNSPECIFIED: ICD-10-CM

## 2025-07-02 DIAGNOSIS — I89.0 LYMPHEDEMA, NOT ELSEWHERE CLASSIFIED: ICD-10-CM

## 2025-07-02 PROCEDURE — 99282 EMERGENCY DEPT VISIT SF MDM: CPT

## 2025-07-02 PROCEDURE — L9995: CPT

## 2025-07-02 NOTE — ED PROVIDER NOTE - OBJECTIVE STATEMENT
Patient is an 84-year-old male history of CAD, lymphedema, hyperlipidemia here for evaluation of weeping to right lower leg similar to previous visit.  Patient was prescribed antibiotics at that time but did not start taking them until yesterday.  Patient denies fever, redness, worsening swelling, chest pain, shortness of

## 2025-07-02 NOTE — ED ADULT NURSE NOTE - CHIEF COMPLAINT QUOTE
Pt has a wound to his right chin.  Daughter was concerned because pt woke up with chills.  Upon arrival temp 98.3.  Dressing changed daily, Wound has been leaking clear fluid.  Dressing last changed yesterday.

## 2025-07-02 NOTE — ED ADULT TRIAGE NOTE - IDEAL BODY WEIGHT(KG)
68 [NL] : pink nasal mucosa [Erythematous Oropharynx] : erythematous oropharynx [Vesicles] : no vesicles [Exudate] : no exudate [Supple] : supple [Clear to Auscultation Bilaterally] : clear to auscultation bilaterally [Regular Rate and Rhythm] : regular rate and rhythm [Soft] : soft [Murmur] : no murmur [Tender] : tender [Anterior Cervical] : anterior cervical [Clear] : clear [FreeTextEntry9] : HYPERACTIVE BS THROUGHOUT [de-identified] : RIGHT [de-identified] : NO PALMAR RASH  +ACNE +HIRSUTE

## 2025-07-02 NOTE — ED PROVIDER NOTE - PATIENT PORTAL LINK FT
You can access the FollowMyHealth Patient Portal offered by [facility] by registering at the following website: [email or web address]. By joining [employer]’s FollowMyHealth portal, you will also be able to view your health information using other applications (apps) compatible with our system.

## 2025-07-02 NOTE — ED PROVIDER NOTE - CLINICAL SUMMARY MEDICAL DECISION MAKING FREE TEXT BOX
This patient presents with return visit to evaluate his LE wounds. He says he has chornic lymphedema and the redness, swelling and drainage has been unchanged. He came to the ED because he had chills this morning which spontaneously resolved. NO oteher systeic sxs. He took 1 dose of keflex today from an rx he was given at his last visit. Didn't take an other medications today.  On exam, he has chronic venous stasis dermatitis. There is clear drainage from small wound on the R calf. No lymphangitic spread visible and no fluid pockets or fluctuance concerning for abscess noted. Low concern for osteomyelitis or DVT given his LE sxs are unchanged from his baseline. No immune compromise, bullae, pain out of proportion, or rapid progression concerning for necrotizing fasciitis. Given he is well appearing, asymptoamtic--advised he complete his abx course and continue to monitor his sxs at home. Return for signs of worsening infection. all questions answered, pt verbalized understanding of the plan.

## 2025-07-02 NOTE — ED PROVIDER NOTE - PHYSICAL EXAMINATION
VITAL SIGNS: I have reviewed nursing notes and confirm.  CONSTITUTIONAL: Well-developed; well-nourished  SKIN: skin exam is warm and dry, no acute rash.    HEAD: Normocephalic; atraumatic.  EYES conjunctiva and sclera clear.  ENT: No nasal discharge; airway clear.  EXT: symmetrical lower extremity edema, weeping to right lower extremity, no redness or warmth Normal ROM.  No clubbing, cyanosis  NEURO: Alert, oriented, grossly unremarkable

## 2025-07-02 NOTE — ED ADULT NURSE NOTE - NSFALLUNIVINTERV_ED_ALL_ED
Bed/Stretcher in lowest position, wheels locked, appropriate side rails in place/Call bell, personal items and telephone in reach/Instruct patient to call for assistance before getting out of bed/chair/stretcher/Non-slip footwear applied when patient is off stretcher/[place] to call system/Physically safe environment - no spills, clutter or unnecessary equipment/Purposeful proactive rounding/Room/bathroom lighting operational, light cord in reach
well appearing/good hygiene